# Patient Record
Sex: FEMALE | Race: WHITE | NOT HISPANIC OR LATINO | Employment: OTHER | URBAN - METROPOLITAN AREA
[De-identification: names, ages, dates, MRNs, and addresses within clinical notes are randomized per-mention and may not be internally consistent; named-entity substitution may affect disease eponyms.]

---

## 2017-06-16 ENCOUNTER — OFFICE VISIT (OUTPATIENT)
Dept: URGENT CARE | Facility: URGENT CARE | Age: 52
End: 2017-06-16
Payer: COMMERCIAL

## 2017-06-16 VITALS
HEART RATE: 68 BPM | DIASTOLIC BLOOD PRESSURE: 67 MMHG | TEMPERATURE: 98.8 F | OXYGEN SATURATION: 98 % | SYSTOLIC BLOOD PRESSURE: 111 MMHG | BODY MASS INDEX: 27.77 KG/M2 | WEIGHT: 148.2 LBS

## 2017-06-16 DIAGNOSIS — R30.0 DYSURIA: Primary | ICD-10-CM

## 2017-06-16 DIAGNOSIS — R82.90 NONSPECIFIC FINDING ON EXAMINATION OF URINE: ICD-10-CM

## 2017-06-16 LAB
ALBUMIN UR-MCNC: ABNORMAL MG/DL
APPEARANCE UR: ABNORMAL
BACTERIA #/AREA URNS HPF: ABNORMAL /HPF
BILIRUB UR QL STRIP: ABNORMAL
COLOR UR AUTO: YELLOW
GLUCOSE UR STRIP-MCNC: NEGATIVE MG/DL
HGB UR QL STRIP: ABNORMAL
KETONES UR STRIP-MCNC: NEGATIVE MG/DL
LEUKOCYTE ESTERASE UR QL STRIP: ABNORMAL
NITRATE UR QL: NEGATIVE
NON-SQ EPI CELLS #/AREA URNS LPF: ABNORMAL /LPF
PH UR STRIP: 5.5 PH (ref 5–7)
RBC #/AREA URNS AUTO: ABNORMAL /HPF (ref 0–2)
SP GR UR STRIP: >1.03 (ref 1–1.03)
URN SPEC COLLECT METH UR: ABNORMAL
UROBILINOGEN UR STRIP-ACNC: 0.2 EU/DL (ref 0.2–1)
WBC #/AREA URNS AUTO: ABNORMAL /HPF (ref 0–2)

## 2017-06-16 PROCEDURE — 87186 SC STD MICRODIL/AGAR DIL: CPT | Performed by: FAMILY MEDICINE

## 2017-06-16 PROCEDURE — 99213 OFFICE O/P EST LOW 20 MIN: CPT | Performed by: FAMILY MEDICINE

## 2017-06-16 PROCEDURE — 87088 URINE BACTERIA CULTURE: CPT | Performed by: FAMILY MEDICINE

## 2017-06-16 PROCEDURE — 87086 URINE CULTURE/COLONY COUNT: CPT | Performed by: FAMILY MEDICINE

## 2017-06-16 PROCEDURE — 81001 URINALYSIS AUTO W/SCOPE: CPT | Performed by: FAMILY MEDICINE

## 2017-06-16 RX ORDER — CIPROFLOXACIN 500 MG/1
500 TABLET, FILM COATED ORAL 2 TIMES DAILY
Qty: 14 TABLET | Refills: 0 | Status: SHIPPED | OUTPATIENT
Start: 2017-06-16 | End: 2018-01-25

## 2017-06-16 NOTE — MR AVS SNAPSHOT
After Visit Summary   6/16/2017    Mary Carmen Jenkins    MRN: [id number]           Patient Information     Date Of Birth          1965        Visit Information        Provider Department      6/16/2017 7:30 PM Provider, Na Thompson MD [facility]        Today's Diagnoses     Dysuria    -  1    Nonspecific finding on examination of urine           Follow-ups after your visit        Who to contact     If you have questions or need follow up information about today's clinic visit or your schedule please contact [facility] directly at [phone].  Normal or non-critical lab and imaging results will be communicated to you by Fair[employer]hart, letter or phone within 4 business days after the clinic has received the results. If you do not hear from us within 7 days, please contact the clinic through FairSoftwarehart or phone. If you have a critical or abnormal lab result, we will notify you by phone as soon as possible.  Submit refill requests through [employer] or call your pharmacy and they will forward the refill request to us. Please allow 3 business days for your refill to be completed.          Additional Information About Your Visit        MyChart Information     [employer] gives you secure access to your electronic health record. If you see a primary care provider, you can also send messages to your care team and make appointments. If you have questions, please call your primary care clinic.  If you do not have a primary care provider, please call [phone] and they will assist you.        Care EveryWhere ID     This is your Care EveryWhere ID. This could be used by other organizations to access your [place] medical records  [id number]        Your Vitals Were     Pulse Temperature Pulse Oximetry BMI (Body Mass Index)          68 98.8  F (37.1  C) (Oral) 98% 27.77 kg/m2         Blood Pressure from Last 3 Encounters:   06/16/17 111/67   06/22/16 107/63    02/02/16 102/52    Weight from Last 3 Encounters:   06/16/17 148 lb 3.2 oz (67.2 kg)   02/02/16 142 lb (64.4 kg)   06/14/13 135 lb (61.2 kg)              We Performed the Following     UA with Microscopic reflex to Culture     Urine Culture Aerobic Bacterial          Today's Medication Changes          These changes are accurate as of: 6/16/17  8:23 PM.  If you have any questions, ask your nurse or doctor.               Start taking these medicines.        Dose/Directions    ciprofloxacin 500 MG tablet   Commonly known as:  CIPRO   Used for:  Dysuria   Started by:  Provider, [place] Thompson MD        Dose:  500 mg   Take 1 tablet (500 mg) by mouth 2 times daily   Quantity:  14 tablet   Refills:  0            Where to get your medicines      These medications were sent to [facility] [id number] [place] [address] [address] AT [facility] [address]  [address], [facility] [address]    Hours:  24-hours Phone:  [phone]     ciprofloxacin 500 MG tablet                Primary Care Provider Office Phone # Fax #    Esa Diallo [phone]       [address]  [facility] [address]        Thank you!     Thank you for choosing [facility]  for your care. Our goal is always to provide you with excellent care. Hearing back from our patients is one way we can continue to improve our services. Please take a few minutes to complete the written survey that you may receive in the mail after your visit with us. Thank you!             Your Updated Medication List - Protect others around you: Learn how to safely use, store and throw away your medicines at www.disposemymeds.org.          This list is accurate as of: 6/16/17  8:23 PM.  Always use your most recent med list.                   Brand Name Dispense Instructions for use    ciprofloxacin 500 MG tablet    CIPRO    14 tablet    Take 1 tablet (500 mg) by mouth 2 times daily        Iron 28 MG Tabs      1 tablet daily       MOTRIN IB PO      prn headache       Multi-vitamin Tabs tablet   Generic drug:  multivitamin, therapeutic with minerals      1 TABLET DAILY

## 2017-06-17 LAB
BACTERIA SPEC CULT: ABNORMAL
MICRO REPORT STATUS: ABNORMAL
MICROORGANISM SPEC CULT: ABNORMAL
SPECIMEN SOURCE: ABNORMAL

## 2017-06-17 NOTE — PROGRESS NOTES
SUBJECTIVE:   Mary Carmen Jenkins is a 52 year old female who  presents today for a possible UTI. Symptoms of dysuria and frequency have been going on for 1day(s).  Hematuria no.  sudden onsetand moderate.  There is no history of fever, chills, nausea or vomiting.  No history of vaginal discharge. This patient does not have a history of urinary tract infections. Patient denies rigors, flank pain and temperature > 101 degrees F. or vaginal discharge     Past Medical History:   Diagnosis Date     Excessive or frequent menstruation      Iron deficiency anemia, unspecified      PONV (postoperative nausea and vomiting)      Thyroid disease     benign multinodular goiter     Current Outpatient Prescriptions   Medication Sig Dispense Refill     ciprofloxacin (CIPRO) 500 MG tablet Take 1 tablet (500 mg) by mouth 2 times daily 14 tablet 0     MOTRIN IB OR prn headache       MULTI-VITAMIN OR TABS 1 TABLET DAILY       IRON 28 MG OR TABS 1 tablet daily       Social History   Substance Use Topics     Smoking status: Never Smoker     Smokeless tobacco: Not on file     Alcohol use 0.0 oz/week     0 drink(s) per week      Comment: every 2-3 months        ROS:   Review of systems negative except as stated above.    OBJECTIVE:  /67 (BP Location: Right arm, Patient Position: Chair, Cuff Size: Adult Regular)  Pulse 68  Temp 98.8  F (37.1  C) (Oral)  Wt 148 lb 3.2 oz (67.2 kg)  SpO2 98%  BMI 27.77 kg/m2  GENERAL APPEARANCE: healthy, alert and no distress  RESP: lungs clear to auscultation - no rales, rhonchi or wheezes  CV: regular rates and rhythm, normal S1 S2, no murmur noted  ABDOMEN:  soft, nontender, no HSM or masses and bowel sounds normal  BACK: No CVA tenderness  SKIN: no suspicious lesions or rashes    ASSESSMENT:   Lower, uncomplicated urinary tract infection.  Mary Carmen was seen today for urinary problem.    Diagnoses and all orders for this visit:    Dysuria  -     UA with Microscopic reflex to Culture  -     Urine  Culture Aerobic Bacterial  -     ciprofloxacin (CIPRO) 500 MG tablet; Take 1 tablet (500 mg) by mouth 2 times daily    Nonspecific finding on examination of urine  -     Urine Culture Aerobic Bacterial          PLAN:  As per ordered above  Drink plenty of fluids.  Prevention and treatment of UTI's discussed.Signs and symptoms of pyelonephritis mentioned.  Follow up with primary care physician if not improving

## 2017-06-17 NOTE — NURSING NOTE
"Chief Complaint   Patient presents with     Urinary Problem     Urgency and discomfort with urination x today        Initial /67 (BP Location: Right arm, Patient Position: Chair, Cuff Size: Adult Regular)  Pulse 68  Temp 98.8  F (37.1  C) (Oral)  Wt 148 lb 3.2 oz (67.2 kg)  SpO2 98%  BMI 27.77 kg/m2 Estimated body mass index is 27.77 kg/(m^2) as calculated from the following:    Height as of 2/2/16: 5' 1.25\" (1.556 m).    Weight as of this encounter: 148 lb 3.2 oz (67.2 kg).  Medication Reconciliation: complete    "

## 2018-01-25 ENCOUNTER — OFFICE VISIT (OUTPATIENT)
Dept: INTERNAL MEDICINE | Facility: CLINIC | Age: 53
End: 2018-01-25
Payer: COMMERCIAL

## 2018-01-25 VITALS
TEMPERATURE: 97.9 F | HEART RATE: 78 BPM | HEIGHT: 61 IN | BODY MASS INDEX: 28.3 KG/M2 | WEIGHT: 149.9 LBS | SYSTOLIC BLOOD PRESSURE: 100 MMHG | OXYGEN SATURATION: 96 % | DIASTOLIC BLOOD PRESSURE: 60 MMHG

## 2018-01-25 DIAGNOSIS — Z13.220 LIPID SCREENING: ICD-10-CM

## 2018-01-25 DIAGNOSIS — N64.4 BREAST PAIN: Primary | ICD-10-CM

## 2018-01-25 DIAGNOSIS — D50.9 IRON DEFICIENCY ANEMIA, UNSPECIFIED IRON DEFICIENCY ANEMIA TYPE: ICD-10-CM

## 2018-01-25 DIAGNOSIS — E04.2 MULTINODULAR GOITER: ICD-10-CM

## 2018-01-25 PROCEDURE — 99214 OFFICE O/P EST MOD 30 MIN: CPT | Performed by: PHYSICIAN ASSISTANT

## 2018-01-25 NOTE — MR AVS SNAPSHOT
After Visit Summary   1/25/2018    Mary Carmen Jenkins    MRN: [id number]           Patient Information     Date Of Birth          1965        Visit Information        Provider Department      1/25/2018 11:40 AM Iris Smith PA-C [facility]        Today's Diagnoses     Multinodular goiter    -  1    Breast pain        Iron deficiency anemia, unspecified iron deficiency anemia type        Lipid screening          Care Instructions    Schedule mammogram     Schedule fasting lab appt               Follow-ups after your visit        Future tests that were ordered for you today     Open Future Orders        Priority Expected Expires Ordered    TSH with free T4 reflex Routine  1/25/2019 1/25/2018    MA Diagnostic Digital Bilateral Routine  1/25/2019 1/25/2018    US Breast Bilateral Complete 4 Quadrants Routine  1/25/2019 1/25/2018    Ferritin Routine  1/25/2019 1/25/2018    CBC with platelets Routine  1/25/2019 1/25/2018    Iron and iron binding capacity Routine  1/25/2019 1/25/2018    Lipid panel reflex to direct LDL Fasting Routine 1/25/2018 1/25/2019 1/25/2018            Who to contact     If you have questions or need follow up information about today's clinic visit or your schedule please contact [facility] directly at [phone].  Normal or non-critical lab and imaging results will be communicated to you by MyChart, letter or phone within 4 business days after the clinic has received the results. If you do not hear from us within 7 days, please contact the clinic through [employer] Rob[employer]hart or phone. If you have a critical or abnormal lab result, we will notify you by phone as soon as possible.  Submit refill requests through [employer] or call your pharmacy and they will forward the refill request to us. Please allow 3 business days for your refill to be completed.          Additional Information About Your Visit        MyChart Information     [employer]  "gives you secure access to your electronic health record. If you see a primary care provider, you can also send messages to your care team and make appointments. If you have questions, please call your primary care clinic.  If you do not have a primary care provider, please call [phone] and they will assist you.        Care EveryWhere ID     This is your Care EveryWhere ID. This could be used by other organizations to access your [place] medical records  [id number]        Your Vitals Were     Pulse Temperature Height Pulse Oximetry BMI (Body Mass Index)       78 97.9  F (36.6  C) (Oral) 5' 1.4\" (1.56 m) 96% 27.96 kg/m2        Blood Pressure from Last 3 Encounters:   01/25/18 100/60   06/16/17 111/67   06/22/16 107/63    Weight from Last 3 Encounters:   01/25/18 149 lb 14.4 oz (68 kg)   06/16/17 148 lb 3.2 oz (67.2 kg)   02/02/16 142 lb (64.4 kg)               Primary Care Provider    Esa Diallo MD       No address on file        Equal Access to Services     [facility]: Hadii aad ku hadasho Soomaali, waaxda luqadaha, qaybta kaalmada ademelida, mariza bueno . So [facility] [phone].    ATENCIÓN: Si habla español, tiene a smith disposición servicios gratuitos de asistencia lingüística. [facility] [phone].    We comply with applicable federal civil rights laws and [place] laws. We do not discriminate on the basis of race, color, national origin, age, disability, sex, sexual orientation, or gender identity.            Thank you!     Thank you for choosing [facility]  for your care. Our goal is always to provide you with excellent care. Hearing back from our patients is one way we can continue to improve our services. Please take a few minutes to complete the written survey that you may receive in the mail after your visit with us. Thank you!             Your Updated Medication List - Protect others around you: Learn how to safely use, store and " throw away your medicines at www.disposemymeds.org.          This list is accurate as of 1/25/18 12:19 PM.  Always use your most recent med list.                   Brand Name Dispense Instructions for use Diagnosis    Iron 28 MG Tabs      1 tablet daily        MOTRIN IB PO      prn headache        Multi-vitamin Tabs tablet   Generic drug:  multivitamin, therapeutic with minerals      1 TABLET DAILY

## 2018-01-25 NOTE — PROGRESS NOTES
"  SUBJECTIVE:   Mary Carmen Jenkins is a 52 year old female who presents to clinic today for the following health issues:      R breast lump      Duration: 2 days    Description (location/character/radiation): Lump on the Rt breast    Intensity:  4/10 at its worst, feels tender    Accompanying signs and symptoms: Mild redness around it.    History (similar episodes/previous evaluation): None    Precipitating or alleviating factors: Pressure on it makes it tender    Therapies tried and outcome: Hot pack and ibuprofen.     Pt notes there was some redness, but now that has improved. She notes she did warm compresses which helped. She notes the bump might be smaller now as well. She denies drainage and fever. She has never had this before. She has no history of breast disease or cancer.     Pt would also like her thyroid levels checked. She reports a history of multinodular goiter. She was initially followed by endocrinology. I was able to review these notes in the Media section of her chart which noted they wanted continue surveillance through their department.    Pt would also like her blood checked for iron deficiency anemia. She has a history of this and now that she does not get a period she thinks it should be improved, but would like to review this.   She has no further concerns.      Problem list and histories reviewed & adjusted, as indicated.  Additional history: as documented    Reviewed and updated as needed this visit by clinical staff  Tobacco  Allergies  Meds  Problems  Fam Hx  Soc Hx      Reviewed and updated as needed this visit by Provider  Meds  Problems  Fam Hx           OBJECTIVE:     /60 (BP Location: Left arm, Patient Position: Chair, Cuff Size: Adult Regular)  Pulse 78  Temp 97.9  F (36.6  C) (Oral)  Ht 5' 1.4\" (1.56 m)  Wt 149 lb 14.4 oz (68 kg)  SpO2 96%  BMI 27.96 kg/m2  Body mass index is 27.96 kg/(m^2).  GENERAL: healthy, alert and no distress  NECK: enlarged thyroid noted. "   RESP: lungs clear to auscultation - no rales, rhonchi or wheezes  BREAST:   Tenderness over the right breast at 11:00 without a lump noted, but pt is quite certain she had felt changes and can feel a small lump now, but I can not appreciate this; otherwise, normal without masses,or nipple discharge and no palpable axillary masses or adenopathy  CV: regular rates and rhythm, normal S1 S2, no S3 or S4 and no murmur, click or rub    ASSESSMENT/PLAN:       1. Breast pain  - pt had noted lump and pain over the past few days   - MA Diagnostic Digital Bilateral; Future  - US Breast Bilateral Complete 4 Quadrants; Future    2. Multinodular goiter  - reviewed recommendation to follow up with endocrinology for continued surveillance and pt declined- I reviewed the benefits of this and risk of not continuing to do this and pt continued declined  - recommended US of thyroid and pt declined   - TSH with free T4 reflex; Future    3. Iron deficiency anemia, unspecified iron deficiency anemia type  - Ferritin; Future  - CBC with platelets; Future  - Iron and iron binding capacity; Future    4. Lipid screening  - Lipid panel reflex to direct LDL Fasting; Future    Pt agrees to the above plan and all questions were answered.     Iris Smith PA-C  [facility]

## 2018-01-26 ENCOUNTER — HOSPITAL ENCOUNTER (OUTPATIENT)
Dept: MAMMOGRAPHY | Facility: CLINIC | Age: 53
Discharge: HOME | End: 2018-01-26
Attending: PHYSICIAN ASSISTANT | Admitting: PHYSICIAN ASSISTANT
Payer: COMMERCIAL

## 2018-01-26 ENCOUNTER — HOSPITAL ENCOUNTER (OUTPATIENT)
Dept: MAMMOGRAPHY | Facility: CLINIC | Age: 53
End: 2018-01-26
Attending: PHYSICIAN ASSISTANT
Payer: COMMERCIAL

## 2018-01-26 DIAGNOSIS — N64.4 BREAST PAIN: ICD-10-CM

## 2018-01-26 DIAGNOSIS — E04.2 MULTINODULAR GOITER: ICD-10-CM

## 2018-01-26 DIAGNOSIS — D50.9 IRON DEFICIENCY ANEMIA, UNSPECIFIED IRON DEFICIENCY ANEMIA TYPE: ICD-10-CM

## 2018-01-26 DIAGNOSIS — Z13.220 LIPID SCREENING: ICD-10-CM

## 2018-01-26 LAB
CHOLEST SERPL-MCNC: 139 MG/DL
ERYTHROCYTE [DISTWIDTH] IN BLOOD BY AUTOMATED COUNT: 13 % (ref 10–15)
FERRITIN SERPL-MCNC: 30 NG/ML (ref 8–252)
HCT VFR BLD AUTO: 43.9 % (ref 35–47)
HDLC SERPL-MCNC: 76 MG/DL
HGB BLD-MCNC: 14.4 G/DL (ref 11.7–15.7)
IRON SATN MFR SERPL: 20 % (ref 15–46)
IRON SERPL-MCNC: 52 UG/DL (ref 35–180)
LDLC SERPL CALC-MCNC: 55 MG/DL
MCH RBC QN AUTO: 29 PG (ref 26.5–33)
MCHC RBC AUTO-ENTMCNC: 32.8 G/DL (ref 31.5–36.5)
MCV RBC AUTO: 88 FL (ref 78–100)
NONHDLC SERPL-MCNC: 63 MG/DL
PLATELET # BLD AUTO: 259 10E9/L (ref 150–450)
RBC # BLD AUTO: 4.97 10E12/L (ref 3.8–5.2)
TIBC SERPL-MCNC: 258 UG/DL (ref 240–430)
TRIGL SERPL-MCNC: 42 MG/DL
TSH SERPL DL<=0.005 MIU/L-ACNC: 1.2 MU/L (ref 0.4–4)
WBC # BLD AUTO: 5.5 10E9/L (ref 4–11)

## 2018-01-26 PROCEDURE — 76642 ULTRASOUND BREAST LIMITED: CPT | Mod: RT

## 2018-01-26 PROCEDURE — 82728 ASSAY OF FERRITIN: CPT | Performed by: PHYSICIAN ASSISTANT

## 2018-01-26 PROCEDURE — 85027 COMPLETE CBC AUTOMATED: CPT | Performed by: PHYSICIAN ASSISTANT

## 2018-01-26 PROCEDURE — 83540 ASSAY OF IRON: CPT | Performed by: PHYSICIAN ASSISTANT

## 2018-01-26 PROCEDURE — G0279 TOMOSYNTHESIS, MAMMO: HCPCS

## 2018-01-26 PROCEDURE — 80061 LIPID PANEL: CPT | Performed by: PHYSICIAN ASSISTANT

## 2018-01-26 PROCEDURE — 83550 IRON BINDING TEST: CPT | Performed by: PHYSICIAN ASSISTANT

## 2018-01-26 PROCEDURE — 36415 COLL VENOUS BLD VENIPUNCTURE: CPT | Performed by: PHYSICIAN ASSISTANT

## 2018-01-26 PROCEDURE — 84443 ASSAY THYROID STIM HORMONE: CPT | Performed by: PHYSICIAN ASSISTANT

## 2018-12-06 ENCOUNTER — OFFICE VISIT (OUTPATIENT)
Dept: FAMILY MEDICINE | Facility: CLINIC | Age: 53
End: 2018-12-06
Payer: COMMERCIAL

## 2018-12-06 VITALS
DIASTOLIC BLOOD PRESSURE: 70 MMHG | OXYGEN SATURATION: 98 % | HEART RATE: 86 BPM | RESPIRATION RATE: 14 BRPM | SYSTOLIC BLOOD PRESSURE: 108 MMHG | BODY MASS INDEX: 27 KG/M2 | WEIGHT: 143 LBS | TEMPERATURE: 98.4 F | HEIGHT: 61 IN

## 2018-12-06 DIAGNOSIS — E04.2 MULTINODULAR GOITER: ICD-10-CM

## 2018-12-06 DIAGNOSIS — E55.9 VITAMIN D DEFICIENCY: ICD-10-CM

## 2018-12-06 DIAGNOSIS — M25.50 PAIN IN JOINT, MULTIPLE SITES: Primary | ICD-10-CM

## 2018-12-06 PROCEDURE — 82306 VITAMIN D 25 HYDROXY: CPT | Performed by: FAMILY MEDICINE

## 2018-12-06 PROCEDURE — 99214 OFFICE O/P EST MOD 30 MIN: CPT | Performed by: FAMILY MEDICINE

## 2018-12-06 PROCEDURE — 80053 COMPREHEN METABOLIC PANEL: CPT | Performed by: FAMILY MEDICINE

## 2018-12-06 PROCEDURE — 36415 COLL VENOUS BLD VENIPUNCTURE: CPT | Performed by: FAMILY MEDICINE

## 2018-12-06 NOTE — MR AVS SNAPSHOT
After Visit Summary   12/6/2018    Mary Carmen Jenkins    MRN: [id number]           Patient Information     Date Of Birth          1965        Visit Information        Provider Department      12/6/2018 3:30 PM Chandrika Grimes DO [facility]        Today's Diagnoses     Pain in joint, multiple sites    -  1       Follow-ups after your visit        Additional Services     RHEUMATOLOGY REFERRAL       Your provider has referred you to: FMG:  [facility].  [phone] [email or web address]    FMG:  [facility]   [phone] [email or web address]  [email or web address] [phone]   [email or web address]    Please be aware that coverage of these services is subject to the terms and limitations of your health insurance plan.  Call member services at your health plan with any benefit or coverage questions.      Please bring the following with you to your appointment:    (1) Any X-Rays, CTs or MRIs which have been performed.  Contact the facility where they were done to arrange for  prior to your scheduled appointment.    (2) List of current medications   (3) This referral request   (4) Any documents/labs given to you for this referral                  Who to contact     If you have questions or need follow up information about today's clinic visit or your schedule please contact [facility] directly at [phone].  Normal or non-critical lab and imaging results will be communicated to you by MyChart, letter or phone within 4 business days after the clinic has received the results. If you do not hear from us within 7 days, please contact the clinic through MyChart or phone. If you have a critical or abnormal lab result, we will notify you by phone as soon as possible.  Submit refill requests through MyChart or call your  "pharmacy and they will forward the refill request to us. Please allow 3 business days for your refill to be completed.          Additional Information About Your Visit        [employer] Information     [employer] gives you secure access to your electronic health record. If you see a primary care provider, you can also send messages to your care team and make appointments. If you have questions, please call your primary care clinic.  If you do not have a primary care provider, please call [phone] and they will assist you.        Care EveryWhere ID     This is your Care EveryWhere ID. This could be used by other organizations to access your [place] medical records  [id number]        Your Vitals Were     Pulse Temperature Respirations Height Pulse Oximetry Breastfeeding?    86 98.4  F (36.9  C) (Tympanic) 14 5' 1.4\" (1.56 m) 98% No    BMI (Body Mass Index)                   26.67 kg/m2            Blood Pressure from Last 3 Encounters:   12/06/18 108/70   01/25/18 100/60   06/16/17 111/67    Weight from Last 3 Encounters:   12/06/18 143 lb (64.9 kg)   01/25/18 149 lb 14.4 oz (68 kg)   06/16/17 148 lb 3.2 oz (67.2 kg)              We Performed the Following     RHEUMATOLOGY REFERRAL          Today's Medication Changes          These changes are accurate as of 12/6/18  4:12 PM.  If you have any questions, ask your nurse or doctor.               Stop taking these medicines if you haven't already. Please contact your care team if you have questions.     Iron 28 MG Tabs   Stopped by:  Chandrika Grimes DO                    Primary Care Provider    Esa Diallo MD       No address on file        Equal Access to Services     [facility]: Hadii aad ku hadasho Soomaali, waaxda luqadaha, qaybta kaalmada adeegyada, mariza bueno . So [facility] [phone].    ATENCIÓN: Si habla español, tiene a smith disposición servicios gratuitos de asistencia lingüística. Llame al [phone].    We " comply with applicable federal civil rights laws and [place] laws. We do not discriminate on the basis of race, color, national origin, age, disability, sex, sexual orientation, or gender identity.            Thank you!     Thank you for choosing [facility]  for your care. Our goal is always to provide you with excellent care. Hearing back from our patients is one way we can continue to improve our services. Please take a few minutes to complete the written survey that you may receive in the mail after your visit with us. Thank you!             Your Updated Medication List - Protect others around you: Learn how to safely use, store and throw away your medicines at www.disposemymeds.org.          This list is accurate as of 12/6/18  4:12 PM.  Always use your most recent med list.                   Brand Name Dispense Instructions for use Diagnosis    MOTRIN IB PO      prn headache        Multi-vitamin tablet   Generic drug:  multivitamin w/minerals      1 TABLET DAILY

## 2018-12-06 NOTE — PROGRESS NOTES
"  SUBJECTIVE:   Mary Carmen Jenkins is a 53 year old female who presents to clinic today for the following health issues:        Consult regarding widespread joint pain      Duration: Since last Spring 2018    Description (location/character/radiation): Knees, hips, groin, inner thigh, shoulders    Intensity:  7/10    Accompanying signs and symptoms: None    History (similar episodes/previous evaluation): Seen at [facility] and was at PT.    Precipitating or alleviating factors: Standing up causes knee pain and stiffness    Therapies tried and outcome: PT, NSAID's           Problem list and histories reviewed & adjusted, as indicated.  Additional history: as documented    Labs reviewed in EPIC    Reviewed and updated as needed this visit by clinical staff  Tobacco  Allergies  Meds  Problems  Med Hx  Surg Hx  Fam Hx  Soc Hx        Reviewed and updated as needed this visit by Provider  Allergies  Meds  Problems         ROS:  CONSTITUTIONAL: NEGATIVE for fever, chills, change in weight  INTEGUMENTARY/SKIN: NEGATIVE for worrisome rashes, moles or lesions  EYES: NEGATIVE for vision changes or irritation  ENT/MOUTH: NEGATIVE for ear, mouth and throat problems  RESP: NEGATIVE for significant cough or SOB  CV: NEGATIVE for chest pain, palpitations or peripheral edema  GI: NEGATIVE for nausea, abdominal pain, heartburn, or change in bowel habits  : NEGATIVE for frequency, dysuria, or hematuria  NEURO: NEGATIVE for weakness, dizziness or paresthesias  HEME: NEGATIVE for bleeding problems    OBJECTIVE:     /70 (Cuff Size: Adult Regular)  Pulse 86  Temp 98.4  F (36.9  C) (Tympanic)  Resp 14  Ht 5' 1.4\" (1.56 m)  Wt 143 lb (64.9 kg)  SpO2 98%  Breastfeeding? No  BMI 26.67 kg/m2  Body mass index is 26.67 kg/(m^2).   GENERAL: healthy, alert and no distress  EYES: Eyes grossly normal to inspection, PERRL and conjunctivae and sclerae normal  HENT: ear canals and TM's normal, nose and mouth without ulcers or " "lesions  NECK: no adenopathy, no asymmetry, masses, or scars and thyroid normal to palpation  RESP: lungs clear to auscultation - no rales, rhonchi or wheezes  CV: regular rate and rhythm, normal S1 S2, no S3 or S4, no murmur, click or rub, no peripheral edema and peripheral pulses strong  ABDOMEN: soft, nontender, no hepatosplenomegaly, no masses and bowel sounds normal  MS: left shoulder ROM and strength is restricted due to her \"frozen shoulder.\"  +tenderness reproducible with moderate palpation behind her knee joints.   SKIN: no suspicious lesions or rashes  NEURO: sensory exam grossly normal and mentation intact  PSYCH: mentation appears normal, affect normal/bright    Diagnostic Test Results:  CMP, Vitamin D  ASSESSMENT/PLAN:     Problem List Items Addressed This Visit     Multinodular goiter      Other Visit Diagnoses     Pain in joint, multiple sites    -  Primary    Relevant Orders    RHEUMATOLOGY REFERRAL    Comprehensive metabolic panel (BMP + Alb, Alk Phos, ALT, AST, Total. Bili, TP) (Completed)    Vitamin D deficiency        Relevant Orders    Vitamin D Deficiency (Completed)           New pt to myself and to this clinic.  Previous care at [facility].  Has a long history of multiple joint pains since last Spring 2018.  Currently sees [facility] and gets Physical Therapy.  Mary Carmen is here to establish care and is requesting the \"next steps\" in her joint pain work-up.    Labs so far are NORMAL:  Lipids (1/2018), iron studies (1/2018), TSH (1/2018), CBC (9/2018)  MOON (9/2018), RF (9/2018), CCP (9/2018), CRP (9/2018)  Lyme disease (Negative, 9/2018)    Imaging:    XR Pelvis and b/l Hips (9/2018):  AP pelvis and lateral views of both hips. Bony structures appear intact. Hip joint spaces appear within normal limits. There are mild degenerative changes of the pubic symphysis. There is no dislocation or significant degenerative change in the hips or SI joints.  Left knee XR (6/2016):  3 views of the left knee show " no acute fracture,  malalignment, or knee joint effusion. Incidentally seen is a small 0.4  cm a calcific/ossific structure that projects at the anteromedial  margin of the knee joint. This may represent a dystrophic soft tissue  calcification. A small joint osteochondral body would be considered  less likely.      I am recommending that she continue seeing PT and make f/u appt with [facility] since they may consider further imaging of her spine and joints.  Referring her to Rheumatology as requested for an evaluation to see if there is any Rheumatologic explanation for her joint pains, though has negative MOON, RF, CCP, CRP and Lyme disease.  Joint pains seems to improve with NSAID use.    Requesting Vitamin D level be checked as well.  Will get CMP as well since it has not been checked for some time.  Consider PMR referral if needed.  Encouraged her to f/u with her Endocrinologist (Dr. Menchaca) for her multiple nodular goiter.  Last Thyroid U/S in 2013 was stable.     Chandrika Grimes, DO  [facility]

## 2018-12-07 LAB
ALBUMIN SERPL-MCNC: 3.9 G/DL (ref 3.4–5)
ALP SERPL-CCNC: 101 U/L (ref 40–150)
ALT SERPL W P-5'-P-CCNC: 22 U/L (ref 0–50)
ANION GAP SERPL CALCULATED.3IONS-SCNC: 7 MMOL/L (ref 3–14)
AST SERPL W P-5'-P-CCNC: 19 U/L (ref 0–45)
BILIRUB SERPL-MCNC: 0.5 MG/DL (ref 0.2–1.3)
BUN SERPL-MCNC: 15 MG/DL (ref 7–30)
CALCIUM SERPL-MCNC: 9.6 MG/DL (ref 8.5–10.1)
CHLORIDE SERPL-SCNC: 103 MMOL/L (ref 94–109)
CO2 SERPL-SCNC: 28 MMOL/L (ref 20–32)
CREAT SERPL-MCNC: 0.81 MG/DL (ref 0.52–1.04)
GFR SERPL CREATININE-BSD FRML MDRD: 73 ML/MIN/1.7M2
GLUCOSE SERPL-MCNC: 93 MG/DL (ref 70–99)
POTASSIUM SERPL-SCNC: 3.9 MMOL/L (ref 3.4–5.3)
PROT SERPL-MCNC: 7.8 G/DL (ref 6.8–8.8)
SODIUM SERPL-SCNC: 138 MMOL/L (ref 133–144)

## 2018-12-08 LAB — DEPRECATED CALCIDIOL+CALCIFEROL SERPL-MC: 37 UG/L (ref 20–75)

## 2018-12-13 ENCOUNTER — TRANSFERRED RECORDS (OUTPATIENT)
Dept: HEALTH INFORMATION MANAGEMENT | Facility: CLINIC | Age: 53
End: 2018-12-13

## 2019-02-15 NOTE — PROGRESS NOTES
[facility] - Rheumatology Clinic Visit     Mary Carmen Jenkins MRN# [id number]   YOB: 1965    Primary care provider: Esa Diallo  Feb 18, 2019          Assessment and Plan:   # Right trochanteric bursitis  # Left frozen shoulder  # Upper back pain- since mid 2018  # Bilateral knee pain - posterior    Basic blood cell counts, liver and kidney function labs within normal limits  MOON neg.   RF neg.   ACPA neg.   CRP within normal limits; sed rate within normal limits   Lyme neg.     We reviewed above lab results. No concerns for rheumatological problems in the labs.     We discussed that there is no evidence of or suspicion for systemic inflammatory autoimmune rheumatic condition in her - both clinically and laboratory-kirk. Patient felt reassured. Her knee pain are in the popliteal area and not articular in origin. No joint effusion. Left frozen shoulder followed by ortho. Her right hip pain is likely trochanteric bursitis. I recommend that she discusses local cortisone injection for this with her orthopedic doctor. Upper back pain is mechanical in nature. No suspicion for spondyloarthropathy. I recommended trying tylenol PRN OTC.   We will fax this note to [facility].     The labs from patient records are reviewed.     I will be back in touch with the patient through mychart/letter when results are available.     Patient agrees with the above mentioned treatment plan.     Most Recent Immunizations   Administered Date(s) Administered     TDAP Vaccine (Adacel) 02/23/2010, 02/23/2010       Orders Placed This Encounter   Procedures     Uric acid       Data Unavailable    There are no discontinued medications.  Current Outpatient Medications   Medication Sig Dispense Refill     MOTRIN IB OR prn headache       MULTI-VITAMIN OR TABS 1 TABLET DAILY         Rohit Alvarado MD  [facility]          Active Problem List:     Patient Active Problem List    Diagnosis Date Noted     Atypical  nevi--Dermatology followup every 6 months 06/18/2012     Priority: Medium     Multinodular goiter 06/18/2012     Priority: Medium     CARDIOVASCULAR SCREENING; LDL GOAL LESS THAN 160 10/31/2010     Priority: Medium     Iron deficiency anemia 02/23/2010     Priority: Medium     Menorrhagia 02/23/2010     Priority: Medium     Alopecia 03/31/2008     Priority: Medium     Problem list name updated by automated process. Provider to review              History of Present Illness:     Chief Complaint   Patient presents with     Establish Care     Referral     Chandrika Grimes DO       February 15, 2019     Have you ever seen a rheumatologist No Who No When NA  Joint pain history  Onset: Patient is here for stiffness in inner thigh, shoulders, upper spine, thighs, and legs that started this spring. Went to [facility] and got basic blood work done and also got a frozen left shoulder. Does note started with plantar fasciitis in December 2017 and the stiffness was more in Spring 2018 after sitting in chair.   Involved joints: see above  Pain scale:  3.5/10  ; worse in the morning  Wakes the patient from sleep : No  Morning stiffness:Yes for 5 minutes; 10-15 mins for upper back   Meds used:motrin IB (not used in a month), but has not used in over a month and more for headaches     Interim history  Since last visit:  1. Infections - No  2. New symptoms/medical problem - No  3. Any side effects from Rheum medications -NA  3. ER visits/Hospitalizations/surgeries - No  4. Last PCP visit: 12/6/18      Wt Readings from Last 4 Encounters:   02/18/19 65.3 kg (144 lb)   12/06/18 64.9 kg (143 lb)   01/25/18 68 kg (149 lb 14.4 oz)   06/16/17 67.2 kg (148 lb 3.2 oz)       No h/o unintentional weight loss, fevers, rash, swollen glands  No h/o gout  No family or personal history of psoriasis, ulcerative colitis or chron's disease. No h/o iritis  Patient denies any raynauds  No h/o persistent shortness of breath, cough, chest pain  No h/o  persistent vomiting, diarrhea, abdominal pain  No h/o hematochezia, hematuria, hemoptysis  No h/o seizures   No h/o cancer    BP Readings from Last 3 Encounters:   19 106/66   18 108/70   18 100/60              Review of Systems:   Complete ROS negative except for symptoms mentioned in the HPI          Past Medical History:     Past Medical History:   Diagnosis Date     Excessive or frequent menstruation      Iron deficiency anemia, unspecified      PONV (postoperative nausea and vomiting)      Thyroid disease     benign multinodular goiter     Past Surgical History:   Procedure Laterality Date     BIOPSY OF SKIN LESION      left scapula (premalignant)     C  DELIVERY ONLY       COLONOSCOPY  2013    Procedure: COLONOSCOPY;  Colonoscopy;  Surgeon: Waldeamr Cuellar MD;  Location:  GI     MOHS MICROGRAPHIC PROCEDURE      right leg     THYROID FNA      Benign.  Normal thyroid function tests            Social History:     Social History     Occupational History     Occupation: Dietician     Employer: [employer]   Tobacco Use     Smoking status: Never Smoker     Smokeless tobacco: Never Used   Substance and Sexual Activity     Alcohol use: Yes     Alcohol/week: 0.0 oz     Comment: every 2-3 months      Drug use: No     Sexual activity: Yes     Partners: Male     Birth control/protection: Surgical     Comment: Vasectomy            Family History:     Family History   Problem Relation Age of Onset     Cerebrovascular Disease Mother 87        massive hemorrhagic stroke, .     Alcohol/Drug Father         recovering times 10 years        Cancer Father         lung     Asthma Other         nephew     C.A.D. Brother         by-pass, 60s     C.A.D. Maternal Uncle         by-pass     Diabetes Maternal Aunt         IDDM     Diabetes Maternal Uncle         IDDM     Hypertension Maternal Aunt      Hypertension Maternal Uncle      Cerebrovascular Disease Maternal Grandmother      Cancer  "Brother      Cancer Paternal Aunt         unsure     Cancer Paternal Uncle         unsure     Heart Disease Maternal Uncle         MI     Lipids Brother      Lipids Brother      Obesity Maternal Aunt      Obesity Maternal Uncle      Obesity Paternal Aunt      Obesity Brother      Obesity Brother             Allergies:     Allergies   Allergen Reactions     Seasonal Allergies      Eyes itchy, runny nose            Medications:     Current Outpatient Medications   Medication Sig Dispense Refill     MOTRIN IB OR prn headache       MULTI-VITAMIN OR TABS 1 TABLET DAILY              Physical Exam:   Blood pressure 106/66, pulse 73, temperature 98  F (36.7  C), temperature source Oral, height 1.56 m (5' 1.4\"), weight 65.3 kg (144 lb), SpO2 99 %, not currently breastfeeding.  Wt Readings from Last 4 Encounters:   02/18/19 65.3 kg (144 lb)   12/06/18 64.9 kg (143 lb)   01/25/18 68 kg (149 lb 14.4 oz)   06/16/17 67.2 kg (148 lb 3.2 oz)       Constitutional: well-developed, appearing stated age; cooperative  Eyes: normal conjunctiva, sclera  ENT: nl external ears, nose, lips.No mucous membrane lesions, normal saliva pool  Neck: no cervical lymphadenopathy  Resp: lungs clear to auscultation in the bases,   CV: RRR, no added sounds  GI: Abdomen soft and no tenderness  : not tested  Lymph: no cervical, supraclavicular or epitrochlear nodes  MS: Mild right trochanteric bursa tenderness. All elbow, wrist, MCP/PIP/DIP, hip, knee, ankle, and foot MTP/IP joints were examined and  found without active synovitis or major deformity. Full ROM.  No dactylitis,  tenosynovitis, enthesopathy.Left shoulder abduction painful beyond about 120 degrees.   Skin: no rash in exposed areas  Psych: nl judgement, orientation, memory, affect.         Data:         Rohit Alvarado MD    [facility]    "

## 2019-02-18 ENCOUNTER — OFFICE VISIT (OUTPATIENT)
Dept: RHEUMATOLOGY | Facility: CLINIC | Age: 54
End: 2019-02-18
Payer: COMMERCIAL

## 2019-02-18 VITALS
BODY MASS INDEX: 27.19 KG/M2 | OXYGEN SATURATION: 99 % | DIASTOLIC BLOOD PRESSURE: 66 MMHG | HEART RATE: 73 BPM | WEIGHT: 144 LBS | TEMPERATURE: 98 F | HEIGHT: 61 IN | SYSTOLIC BLOOD PRESSURE: 106 MMHG

## 2019-02-18 DIAGNOSIS — M25.50 POLYARTHRALGIA: ICD-10-CM

## 2019-02-18 DIAGNOSIS — M54.9 UPPER BACK PAIN: Primary | ICD-10-CM

## 2019-02-18 PROCEDURE — 36415 COLL VENOUS BLD VENIPUNCTURE: CPT | Performed by: INTERNAL MEDICINE

## 2019-02-18 PROCEDURE — 99204 OFFICE O/P NEW MOD 45 MIN: CPT | Performed by: INTERNAL MEDICINE

## 2019-02-18 PROCEDURE — 84550 ASSAY OF BLOOD/URIC ACID: CPT | Performed by: INTERNAL MEDICINE

## 2019-02-18 ASSESSMENT — ROUTINE ASSESSMENT OF PATIENT INDEX DATA (RAPID3)
RAPID3 INTERPRETATION: MODERATE 6.1-12.0
TOTAL RAPID3 SCORE: 9.5

## 2019-02-18 ASSESSMENT — MIFFLIN-ST. JEOR: SCORE: 1191.91

## 2019-02-18 NOTE — NURSING NOTE
"Chief Complaint   Patient presents with     Establish Care     Referral     Chandrika Grimes DO       Initial /66   Pulse 73   Temp 98  F (36.7  C) (Oral)   Ht 1.56 m (5' 1.4\")   Wt 65.3 kg (144 lb)   SpO2 99%   BMI 26.86 kg/m   Estimated body mass index is 26.86 kg/m  as calculated from the following:    Height as of this encounter: 1.56 m (5' 1.4\").    Weight as of this encounter: 65.3 kg (144 lb).  Medication Reconciliation: complete    Have you ever seen a rheumatologist No Who No When NA  Joint pain history  Onset: Patient is here for stiffness in inner thigh, shoulders, upper spine, thighs, and legs that started this spring. Went to [facility] and got basic blood work done and also got a frozen left shoulder. Does note started with plantar fasciitis in December 2017 and the stiffness was more in Spring 2018 after sitting in chair.   Involved joints: see above  Pain scale:  3.5/10     Wakes the patient from sleep : No  Morning stiffness:Yes for 5 minutes  Meds used:motrin IB, but has not used in over a month and more for headaches    Interim history  Since last visit:  1. Infections - No  2. New symptoms/medical problem - No  3. Any side effects from Rheum medications -NA  3. ER visits/Hospitalizations/surgeries - No  4. Last PCP visit: 12/6/18  Wt Readings from Last 4 Encounters:   02/18/19 65.3 kg (144 lb)   12/06/18 64.9 kg (143 lb)   01/25/18 68 kg (149 lb 14.4 oz)   06/16/17 67.2 kg (148 lb 3.2 oz)     BP Readings from Last 3 Encounters:   02/18/19 106/66   12/06/18 108/70   01/25/18 100/60       "

## 2019-02-18 NOTE — PATIENT INSTRUCTIONS
# Please discuss with your [facility] doctor regarding trochanteric bursitis and local cortisone injection  # Try tylenol (over-the-counter) dosing

## 2019-02-18 NOTE — NURSING NOTE
Today's OV faxed to PCP Shruthi Dougherty at [facility]  per patient request at [phone].    Kate Douglas, CMA

## 2019-02-19 LAB — URATE SERPL-MCNC: 3.4 MG/DL (ref 2.6–6)

## 2019-02-19 NOTE — RESULT ENCOUNTER NOTE
Results released to [facility]:  Uric acid gout test within normal limits       Sincerely    Rohit Alvarado MD  [facility]

## 2021-04-19 ENCOUNTER — IMMUNIZATION (OUTPATIENT)
Dept: NURSING | Facility: CLINIC | Age: 56
End: 2021-04-19
Payer: COMMERCIAL

## 2021-04-19 PROCEDURE — 91300 PR COVID VAC PFIZER DIL RECON 30 MCG/0.3 ML IM: CPT

## 2021-04-19 PROCEDURE — 0001A PR COVID VAC PFIZER DIL RECON 30 MCG/0.3 ML IM: CPT

## 2021-05-13 ENCOUNTER — IMMUNIZATION (OUTPATIENT)
Dept: NURSING | Facility: CLINIC | Age: 56
End: 2021-05-13
Attending: INTERNAL MEDICINE
Payer: COMMERCIAL

## 2021-05-13 PROCEDURE — 0002A PR COVID VAC PFIZER DIL RECON 30 MCG/0.3 ML IM: CPT

## 2021-05-13 PROCEDURE — 91300 PR COVID VAC PFIZER DIL RECON 30 MCG/0.3 ML IM: CPT

## 2022-02-12 ENCOUNTER — LAB (OUTPATIENT)
Dept: URGENT CARE | Facility: URGENT CARE | Age: 57
End: 2022-02-12
Attending: FAMILY MEDICINE
Payer: COMMERCIAL

## 2022-02-12 ENCOUNTER — E-VISIT (OUTPATIENT)
Dept: URGENT CARE | Facility: CLINIC | Age: 57
End: 2022-02-12
Payer: COMMERCIAL

## 2022-02-12 DIAGNOSIS — J02.9 SORE THROAT: ICD-10-CM

## 2022-02-12 DIAGNOSIS — Z20.822 SUSPECTED COVID-19 VIRUS INFECTION: ICD-10-CM

## 2022-02-12 LAB
DEPRECATED S PYO AG THROAT QL EIA: NEGATIVE
GROUP A STREP BY PCR: NOT DETECTED
SARS-COV-2 RNA RESP QL NAA+PROBE: NEGATIVE

## 2022-02-12 PROCEDURE — 87651 STREP A DNA AMP PROBE: CPT

## 2022-02-12 PROCEDURE — U0003 INFECTIOUS AGENT DETECTION BY NUCLEIC ACID (DNA OR RNA); SEVERE ACUTE RESPIRATORY SYNDROME CORONAVIRUS 2 (SARS-COV-2) (CORONAVIRUS DISEASE [COVID-19]), AMPLIFIED PROBE TECHNIQUE, MAKING USE OF HIGH THROUGHPUT TECHNOLOGIES AS DESCRIBED BY CMS-2020-01-R: HCPCS

## 2022-02-12 PROCEDURE — 99421 OL DIG E/M SVC 5-10 MIN: CPT | Performed by: FAMILY MEDICINE

## 2022-02-12 PROCEDURE — U0005 INFEC AGEN DETEC AMPLI PROBE: HCPCS

## 2022-02-12 NOTE — PATIENT INSTRUCTIONS
Mary Carmen,    Complicated there with your recent history of covid and how bad the throat is in the mix. I agree it makes sense to test you for both strep and covid.     What should I do?  We would like to test you for COVID-19 virus and Strep Throat. I have placed orders for these tests. To schedule: go to your [facility]-care home page and scroll down to the section that says  You have an appointment that needs to be scheduled  and click the large green button that says  Schedule Now  and follow the steps to find the next available openings. It is important that when you are asked what the reason for your appointment is that you mention you need BOTH COVID and Strep tests.    If you are unable to complete these H-care scheduling steps, please call [phone] to schedule your testing.     Return to work/school/ guidance:   Please let your workplace manager and staffing office know when your isolation ends.       If you receive a positive COVID-19 test result, follow the guidance of the those who are giving you the results. Usually the return to work is 10 days from symptom onset or positive test date (or in some cases 20 days if you are immunocompromised). If your symptoms started after your positive test, the 10 days should start when your symptoms started.   o If you work at [employer], you must also be cleared by Employee Occupational Health and Safety to return to work.      If you receive a negative COVID-19 test result and did not have a high risk exposure to someone with a known positive COVID-19 test, you can return to work once you're free of fever for 24 hours without fever-reducing medication and your symptoms are improving or resolved.    If you receive a negative COVID-19 test and if you had a high risk exposure to someone who has tested positive for COVID-19 then you can return to work 14 days after your last contact with the positive individual. Follow quarantine guidance given by your doctor or  public health officials.    Sign up for [employer].   We know it's scary to hear that you might have COVID-19. We want to track your symptoms to make sure you're okay over the next 2 weeks. Please look for an email from [employer]--this is a free, online program that we'll use to keep in touch. To sign up, follow the link in the email you will receive. Learn more at [email or web address]    How can I take care of myself?  Over the counter medications may help with your symptoms like congestion, cough, chills, or fever.       Get lots of rest. Drink extra fluids (unless a doctor has told you not to)    Take Tylenol (acetaminophen) or ibuprofen for fever or pain. If you have liver or kidney problems, ask your family doctor if it's okay to take Tylenol or ibuprofen    Take over the counter medications for your symptoms, as directed by your doctor. You may also talk to your pharmacist.      If you have other health problems (like cancer, heart failure, an organ transplant or severe kidney disease): Call your specialty clinic if you don't feel better in the next 2 days.    Know when to call 911. Emergency warning signs include:  o Trouble breathing or shortness of breath  o Pain or pressure in the chest that doesn't go away  o Feeling confused like you haven't felt before, or not being able to wake up  o Bluish-colored lips or face    Where can I get more information?    [facility] - About COVID-19: [email or web address]     CDC - What to Do If You're Sick:   www.cdc.gov/coronavirus/2019-ncov/about/steps-when-sick.html    CDC - Ending Home Isolation:  https://www.cdc.gov/coronavirus/2019-ncov/your-health/quarantine-isolation.html    CDC - Caring for Someone:  www.cdc.gov/coronavirus/2019-ncov/if-you-are-sick/care-for-someone.html    [facility] clinical trials (COVID-19 research studies): [email or web address]    Below are the COVID-19 hotlines at the  [place] Department of Health ([facility]). Interpreters are available.  o For health questions: Call 171-643-1199 or 1-340.956.2106 (7 a.m. to 7 p.m.)  o For questions about schools and childcare: Call 524-495-1279 or 1-851.314.9235 (7 a.m. to 7 p.m.)

## 2022-10-01 ENCOUNTER — HOSPITAL ENCOUNTER (EMERGENCY)
Facility: CLINIC | Age: 57
Discharge: HOME | End: 2022-10-01
Attending: EMERGENCY MEDICINE | Admitting: EMERGENCY MEDICINE
Payer: COMMERCIAL

## 2022-10-01 VITALS
TEMPERATURE: 97.6 F | RESPIRATION RATE: 18 BRPM | OXYGEN SATURATION: 98 % | DIASTOLIC BLOOD PRESSURE: 75 MMHG | SYSTOLIC BLOOD PRESSURE: 134 MMHG | HEART RATE: 96 BPM

## 2022-10-01 DIAGNOSIS — N39.0 ACUTE UTI: ICD-10-CM

## 2022-10-01 DIAGNOSIS — H04.552 OBSTRUCTION OF LEFT LACRIMAL DUCT: ICD-10-CM

## 2022-10-01 LAB
ALBUMIN UR-MCNC: 30 MG/DL
APPEARANCE UR: ABNORMAL
BACTERIA #/AREA URNS HPF: ABNORMAL /HPF
BILIRUB UR QL STRIP: NEGATIVE
COLOR UR AUTO: ABNORMAL
GLUCOSE UR STRIP-MCNC: NEGATIVE MG/DL
HGB UR QL STRIP: ABNORMAL
KETONES UR STRIP-MCNC: 10 MG/DL
LEUKOCYTE ESTERASE UR QL STRIP: ABNORMAL
MUCOUS THREADS #/AREA URNS LPF: PRESENT /LPF
NITRATE UR QL: NEGATIVE
PH UR STRIP: 6.5 [PH] (ref 5–7)
RBC URINE: >182 /HPF
SP GR UR STRIP: 1.01 (ref 1–1.03)
UROBILINOGEN UR STRIP-MCNC: NORMAL MG/DL
WBC CLUMPS #/AREA URNS HPF: PRESENT /HPF
WBC URINE: >182 /HPF

## 2022-10-01 PROCEDURE — 81001 URINALYSIS AUTO W/SCOPE: CPT | Performed by: EMERGENCY MEDICINE

## 2022-10-01 PROCEDURE — 87086 URINE CULTURE/COLONY COUNT: CPT | Performed by: EMERGENCY MEDICINE

## 2022-10-01 PROCEDURE — 99284 EMERGENCY DEPT VISIT MOD MDM: CPT

## 2022-10-01 RX ORDER — LORAZEPAM 2 MG/ML
2 INJECTION INTRAMUSCULAR ONCE
Status: DISCONTINUED | OUTPATIENT
Start: 2022-10-01 | End: 2022-10-01

## 2022-10-01 RX ORDER — CIPROFLOXACIN HYDROCHLORIDE 3.5 MG/ML
1-2 SOLUTION/ DROPS TOPICAL EVERY 4 HOURS
Qty: 5 ML | Refills: 0 | Status: SHIPPED | OUTPATIENT
Start: 2022-10-01 | End: 2022-10-06

## 2022-10-01 RX ORDER — CEPHALEXIN 500 MG/1
500 CAPSULE ORAL 2 TIMES DAILY
Qty: 14 CAPSULE | Refills: 0 | Status: SHIPPED | OUTPATIENT
Start: 2022-10-01 | End: 2022-10-08

## 2022-10-01 ASSESSMENT — ENCOUNTER SYMPTOMS
NAUSEA: 0
EYE REDNESS: 0
FLANK PAIN: 0
HEMATURIA: 1
ABDOMINAL PAIN: 0
DYSURIA: 1
EYE PAIN: 0
VOMITING: 0

## 2022-10-01 ASSESSMENT — ACTIVITIES OF DAILY LIVING (ADL): ADLS_ACUITY_SCORE: 33

## 2022-10-01 NOTE — DISCHARGE INSTRUCTIONS
Discharge Instructions  Urinary Tract Infection  You or your child have been diagnosed with a urinary tract infection, or UTI. The urinary tract includes the kidneys (which make urine/pee), ureters (the tubes that carry urine/pee from the kidneys to the bladder), the bladder (which stores urine/pee), and urethra (the tube that carries urine/pee out of the bladder). Urinary tract infections occur when bacteria travel up the urethra into the bladder (bladder infection) and, in some cases, from there into the kidneys (kidney infection).  Generally, every Emergency Department visit should have a follow-up clinic visit with either a primary or a specialty clinic/provider. Please follow-up as instructed by your emergency provider today.  Return to the Emergency Department if:  You or your child have severe back pain.  You or your child are vomiting (throwing up) so that you cannot take your medicine.  You or your child have a new fever (had not previously had a fever) over 101 F.  You or your child have confusion or are very weak, or feel very ill.  Your child seems much more ill, will not wake up, will not respond right, or is crying for a long time and will not calm down.  You or your child are showing signs of dehydration. These signs may include decreased urination (pee), dry mouth/gums/tongue, or decreased activity.    Follow-up with your provider:   Children under 24 months need to be seen by their regular provider within one week after a diagnosis of a UTI. It may be necessary to do some more tests to look at the child s kidney or bladder.  You should begin to feel better within 24 - 48 hours of starting your antibiotic; follow-up with your regular clinic/doctor/provider if this is not the case.    Treatment:   You will be treated with an antibiotic to kill the bacteria. We have to make an educated guess, based on what we know about common bacteria and antibiotics, as to which antibiotic will work for your  "infection. We will be correct most times but there will be some cases where the antibiotic chosen is not correct (see urine cultures below).  Take a pain medication such as acetaminophen (Tylenol ) or ibuprofen (Advil , Motrin , Nuprin ).  Phenazopyridine (Pyridium , Uristat ) is a prescription medication that numbs the bladder to reduce the burning pain of some UTIs.  The same medication is available in a non-prescription version (Azo-Standard , Urodol ). This medication will change the color of the urine and tears (usually blue or orange). If you wear contacts, do not wear them while taking this medication as they may be stained by the medication.    Urine Cultures:  If indicated, a urine culture may have been performed today. This test generally takes 24-48 hours to complete so the results are not known at this time. The results can confirm that an infection is present but also determine which antibiotic is effective for the specific bacteria that is causing the infection. If your urine culture shows that the antibiotic you were given today will not work to treat your infection, we will attempt to contact you to make arrangements to change the antibiotic. If the culture confirms that the antibiotic is effective for your infection, you will not be contacted. We often recommend follow-up with your regular physician/provider on the culture results regardless of this process.    Antibiotic Warning:   If you have been placed on antibiotics - watch for signs of allergic reaction.  These include rash, lip swelling, difficulty breathing, wheezing, and dizziness.  If you develop any of these symptoms, stop the antibiotic immediately and go to an emergency room or urgent care for evaluation.    Probiotics: If you have been given an antibiotic, you may want to also take a probiotic pill or eat yogurt with live cultures. Probiotics have \"good bacteria\" to help your intestines stay healthy. Studies have shown that probiotics " help prevent diarrhea and other intestine problems (including C. diff infection) when you take antibiotics. You can buy these without a prescription in the pharmacy section of the store.   If you were given a prescription for medicine here today, be sure to read all of the information (including the package insert) that comes with your prescription.  This will include important information about the medicine, its side effects, and any warnings that you need to know about.  The pharmacist who fills the prescription can provide more information and answer questions you may have about the medicine.  If you have questions or concerns that the pharmacist cannot address, please call or return to the Emergency Department.   Remember that you can always come back to the Emergency Department if you are not able to see your regular provider in the amount of time listed above, if you get any new symptoms, or if there is anything that worries you.     You may have a lacrimal duct blockage (less likely infection) or conjunctivitis (less likely). Use warm compresses regularly (do not burn your face), gentle massage,  and eye drops as discussed.

## 2022-10-01 NOTE — ED PROVIDER NOTES
History     Chief Complaint:  Eye Pain and UTI       HPI   Mary Carmen Jenkins is a 57 year old female who presents with concerns for swelling near her left lower eyelid as well as dysuria.  She notes yesterday she developed some discomfort in the medial aspect of her left eye.  She woke this morning and it was swollen and little red.  She notes that she used warm compresses yesterday to help.  She denies pain of the eye itself or any vision changes.  She wears contact lenses daily but does not have them in now.  She is concerned about a blocked tear duct.  She also notes when she awoke she was having pain with urination and some blood in her urine.  She denies abdominal pain or flank pain. She denies fever or chills.  She denies nausea or vomiting.    ROS:  Review of Systems   Eyes: Negative for pain and redness.        Positive for swelling and redness in the left lower lid   Gastrointestinal: Negative for abdominal pain, nausea and vomiting.   Genitourinary: Positive for dysuria and hematuria. Negative for flank pain.   All other systems reviewed and are negative.        Allergies:  Seasonal Allergies     Medications:    No daily medications    Past Medical History:    Past Medical History:   Diagnosis Date     Excessive or frequent menstruation      Iron deficiency anemia, unspecified      PONV (postoperative nausea and vomiting)      Thyroid disease        Past Surgical History:    Past Surgical History:   Procedure Laterality Date     BIOPSY OF SKIN LESION      left scapula (premalignant)     C  DELIVERY ONLY       COLONOSCOPY  2013    Procedure: COLONOSCOPY;  Colonoscopy;  Surgeon: Waldemar Cuellar MD;  Location:  GI     MOHS MICROGRAPHIC PROCEDURE      right leg     THYROID FNA      Benign.  Normal thyroid function tests        Family History:    family history includes Alcohol/Drug in her father; Asthma in an other family member; C.A.D. in her brother and maternal uncle; Cancer in her  brother, father, paternal aunt, and paternal uncle; Cerebrovascular Disease in her maternal grandmother; Cerebrovascular Disease (age of onset: 87) in her mother; Diabetes in her maternal aunt and maternal uncle; Heart Disease in her maternal uncle; Hypertension in her maternal aunt and maternal uncle; Lipids in her brother and brother; Obesity in her brother, brother, maternal aunt, maternal uncle, and paternal aunt.    Social History:   reports that she has never smoked. She has never used smokeless tobacco. She reports current alcohol use. She reports that she does not use drugs.  PCP: [facility]     Physical Exam     Patient Vitals for the past 24 hrs:   BP Temp Temp src Pulse Resp SpO2   10/01/22 0524 134/75 97.6  F (36.4  C) Temporal 96 18 98 %        Physical Exam  General: Adult female sitting upright  Eyes: PERRL, Conjunctive within normal limits on the right, mildly injected on the left. EOMI.  Mild left medial lower eyelid edema and associated erythema.  The left lacrimal duct os appears mildly dilated compared to the right.  No drainage.  ENT: Moist mucous membranes, oropharynx clear.   GI: Abdomen is soft, nontender and nondistended. No palpable masses. No rebound or guarding.  No CVA tenderness to percussion  MSK: Ambulatory  Skin: Warm and dry.  Mild erythema of the left medial canthus/lower lid region as described above.  Neuro: Alert and oriented. Responds appropriately to all questions and commands.   Psych: Normal mood and affect. Pleasant.    Emergency Department Course     Laboratory:  Labs Ordered and Resulted from Time of ED Arrival to Time of ED Departure   ROUTINE UA WITH MICROSCOPIC REFLEX TO CULTURE - Abnormal       Result Value    Color Urine Orange (*)     Appearance Urine Slightly Cloudy (*)     Glucose Urine Negative      Bilirubin Urine Negative      Ketones Urine 10  (*)     Specific Gravity Urine 1.011      Blood Urine Large (*)     pH Urine 6.5      Protein  Albumin Urine 30  (*)     Urobilinogen Urine Normal      Nitrite Urine Negative      Leukocyte Esterase Urine Large (*)     Bacteria Urine Moderate (*)     WBC Clumps Urine Present (*)     Mucus Urine Present (*)     RBC Urine >182 (*)     WBC Urine >182 (*)    URINE CULTURE          Emergency Department Course:    Reviewed:  I reviewed nursing notes, vitals and past medical history    Assessments:   I obtained history and examined the patient as noted above.    I rechecked the patient and explained findings.  She denies any new concerns.    Disposition:  The patient was discharged to home.     Impression & Plan    Medical Decision Making:  Mary Carmen Jenkins is a 57-year-old female who presents emergency department due to concerns, one being onset of dysuria and hematuria this morning and the other being mild redness and swelling of the left medial eye.  Clinically she likely has hemorrhagic cystitis.  She has no findings that would suggest that there is an ascending UTI/pyelonephritis.  She is afebrile and well in appearance.  She has no abdominal or flank pain or tenderness.  With regards to her eye, perhaps mild conjunctivitis versus lacrimal duct obstruction.  Bacterial infection seems less likely at this point with short duration of symptoms.  Recommended warm compresses and gentle massage of the area.  Sent home with ciprofloxacin eyedrops and so contact lens user although she has no eye pain or clinical concerns for corneal abrasion/ulceration.  She has [place] no eye pain aside from at the medial region where the lacrimal duct is.  She is recommended follow-up with ophthalmology on Monday with ongoing symptoms for this.  With regards to UTI.  She also follow-up on Monday with her primary care provider for reassessment should she have ongoing symptoms.  Should return immediate emergency department worsening of any of her symptoms.  All questions were answered prior to discharge.      Diagnosis:    ICD-10-CM     1. Acute UTI  N39.0    2. Obstruction of left lacrimal duct  H04.552         Discharge Medications:  Discharge Medication List as of 10/1/2022  7:11 AM      START taking these medications    Details   cephALEXin (KEFLEX) 500 MG capsule Take 1 capsule (500 mg) by mouth 2 times daily for 7 days, Disp-14 capsule, R-0, E-Prescribe      ciprofloxacin (CILOXAN) 0.3 % ophthalmic solution Place 1-2 drops Into the left eye every 4 hours for 5 days, Disp-5 mL, R-0, E-Prescribe              10/1/2022   Aysha Newell MD Jonkman, Tracy Dianne, MD  10/01/22 1035

## 2022-10-01 NOTE — ED TRIAGE NOTES
Pt arrives to ED with dysuria and clogged L tear duct. Dysuria began at 0400 this am, denies flank pain or fevers. Pt states duct problem has been going on since yesterday. Pt took 2 azo PTA.

## 2022-10-02 LAB
BACTERIA UR CULT: ABNORMAL
BACTERIA UR CULT: ABNORMAL

## 2022-10-17 ENCOUNTER — TELEPHONE (OUTPATIENT)
Dept: OBGYN | Facility: CLINIC | Age: 57
End: 2022-10-17

## 2022-10-17 NOTE — TELEPHONE ENCOUNTER
Reason for Call:  Other appointment    Detailed comments: Pt needs first available appt and would also like a mammogram     Phone Number Patient can be reached at: Cell number on file:    Telephone Information:   Mobile [phone]       Best Time: Anytime    Can we leave a detailed message on this number? YES    Call taken on 10/17/2022 at 4:51 PM by MAU CABA

## 2022-10-21 ENCOUNTER — HOSPITAL ENCOUNTER (OUTPATIENT)
Dept: MAMMOGRAPHY | Facility: CLINIC | Age: 57
Discharge: HOME | End: 2022-10-21
Attending: OBSTETRICS & GYNECOLOGY | Admitting: OBSTETRICS & GYNECOLOGY
Payer: COMMERCIAL

## 2022-10-21 DIAGNOSIS — Z12.31 VISIT FOR SCREENING MAMMOGRAM: ICD-10-CM

## 2022-10-21 PROCEDURE — 77067 SCR MAMMO BI INCL CAD: CPT

## 2023-01-12 ENCOUNTER — OFFICE VISIT (OUTPATIENT)
Dept: OBGYN | Facility: CLINIC | Age: 58
End: 2023-01-12
Payer: COMMERCIAL

## 2023-01-12 VITALS
HEIGHT: 61 IN | DIASTOLIC BLOOD PRESSURE: 82 MMHG | SYSTOLIC BLOOD PRESSURE: 124 MMHG | WEIGHT: 143 LBS | BODY MASS INDEX: 27 KG/M2 | HEART RATE: 80 BPM

## 2023-01-12 DIAGNOSIS — Z12.4 PAP SMEAR FOR CERVICAL CANCER SCREENING: ICD-10-CM

## 2023-01-12 DIAGNOSIS — Z12.11 COLON CANCER SCREENING: ICD-10-CM

## 2023-01-12 DIAGNOSIS — N95.2 ATROPHIC VAGINITIS: ICD-10-CM

## 2023-01-12 DIAGNOSIS — Z00.00 ROUTINE GENERAL MEDICAL EXAMINATION AT A HEALTH CARE FACILITY: ICD-10-CM

## 2023-01-12 DIAGNOSIS — Z13.220 SCREENING FOR LIPID DISORDERS: ICD-10-CM

## 2023-01-12 DIAGNOSIS — Z86.2 HISTORY OF ANEMIA: Primary | ICD-10-CM

## 2023-01-12 LAB
CHOLEST SERPL-MCNC: 167 MG/DL
FASTING STATUS PATIENT QL REPORTED: YES
FERRITIN SERPL-MCNC: 69 NG/ML (ref 11–328)
GLUCOSE SERPL-MCNC: 96 MG/DL (ref 70–99)
HDLC SERPL-MCNC: 71 MG/DL
HGB BLD-MCNC: 14.9 G/DL (ref 11.7–15.7)
LDLC SERPL CALC-MCNC: 84 MG/DL
NONHDLC SERPL-MCNC: 96 MG/DL
TRIGL SERPL-MCNC: 59 MG/DL
TSH SERPL DL<=0.005 MIU/L-ACNC: 1.12 UIU/ML (ref 0.3–4.2)

## 2023-01-12 PROCEDURE — 84443 ASSAY THYROID STIM HORMONE: CPT | Performed by: OBSTETRICS & GYNECOLOGY

## 2023-01-12 PROCEDURE — 90682 RIV4 VACC RECOMBINANT DNA IM: CPT | Performed by: OBSTETRICS & GYNECOLOGY

## 2023-01-12 PROCEDURE — 99386 PREV VISIT NEW AGE 40-64: CPT | Mod: 25 | Performed by: OBSTETRICS & GYNECOLOGY

## 2023-01-12 PROCEDURE — 80061 LIPID PANEL: CPT | Performed by: OBSTETRICS & GYNECOLOGY

## 2023-01-12 PROCEDURE — G0145 SCR C/V CYTO,THINLAYER,RESCR: HCPCS | Performed by: OBSTETRICS & GYNECOLOGY

## 2023-01-12 PROCEDURE — 36415 COLL VENOUS BLD VENIPUNCTURE: CPT | Performed by: OBSTETRICS & GYNECOLOGY

## 2023-01-12 PROCEDURE — 90471 IMMUNIZATION ADMIN: CPT | Performed by: OBSTETRICS & GYNECOLOGY

## 2023-01-12 PROCEDURE — 99213 OFFICE O/P EST LOW 20 MIN: CPT | Mod: 25 | Performed by: OBSTETRICS & GYNECOLOGY

## 2023-01-12 PROCEDURE — 82947 ASSAY GLUCOSE BLOOD QUANT: CPT | Performed by: OBSTETRICS & GYNECOLOGY

## 2023-01-12 PROCEDURE — 85018 HEMOGLOBIN: CPT | Performed by: OBSTETRICS & GYNECOLOGY

## 2023-01-12 PROCEDURE — 87624 HPV HI-RISK TYP POOLED RSLT: CPT | Performed by: OBSTETRICS & GYNECOLOGY

## 2023-01-12 PROCEDURE — 82728 ASSAY OF FERRITIN: CPT | Performed by: OBSTETRICS & GYNECOLOGY

## 2023-01-12 RX ORDER — ESTRADIOL 0.1 MG/G
CREAM VAGINAL
Qty: 42.5 G | Refills: 6 | Status: SHIPPED | OUTPATIENT
Start: 2023-01-12 | End: 2024-04-26 | Stop reason: SINTOL

## 2023-01-12 NOTE — PROGRESS NOTES
Mary Carmen is a 57 year old  who presents for annual exam.   Postmenopausal.  She is having vaginal dryness. No vaginal bleeding noted.     Besides routine health maintenance,  she would like to discuss vaginal dryness and pain with intercourse as a result. No issues with any time other than with intercourse. Bladder leakage at times with exercise (walking for exercise).  GYNECOLOGIC HISTORY:  She is sexually active with 1 male partner(s).  STI testing offered?  not indicated  History of abnormal Pap smear: NO - age 30-65 PAP every 5 years with negative HPV co-testing recommended  Family history of breast CA: No  Family history of uterine/ovarian CA: No     Family history of colon CA: No    HEALTH MAINTENANCE:  Reviewed.    HISTORY:  OB History    Para Term  AB Living   4 3 3 0 1 3   SAB IAB Ectopic Multiple Live Births   1 0 0 0 3      # Outcome Date GA Lbr Pino/2nd Weight Sex Delivery Anes PTL Lv   4 SAB      SAB   DEC   3 Term  40w0d       STEPHEN   2 Term  40w0d       STEPHEN   1 Term  40w0d    CS   STEPHEN     Past Medical History:   Diagnosis Date     Excessive or frequent menstruation      Iron deficiency anemia, unspecified      PONV (postoperative nausea and vomiting)      Thyroid disease     benign multinodular goiter     Past Surgical History:   Procedure Laterality Date     BIOPSY OF SKIN LESION      left scapula (premalignant)     COLONOSCOPY  2013    Procedure: COLONOSCOPY;  Colonoscopy;  Surgeon: Wadlemar Cuellar MD;  Location:  GI     MOHS MICROGRAPHIC PROCEDURE      right leg     THYROID FNA      Benign.  Normal thyroid function tests     ZZC  DELIVERY ONLY       Family History   Problem Relation Age of Onset     Cerebrovascular Disease Mother 87        massive hemorrhagic stroke, .     Alcohol/Drug Father         recovering times 10 years        Cancer Father         lung     C.A.D. Brother         by-pass, 60s     Cancer Brother         Leukemia      "Lipids Brother      Lipids Brother      Obesity Brother      Obesity Brother      Cerebrovascular Disease Maternal Grandmother      Diabetes Maternal Aunt         IDDM     Hypertension Maternal Aunt      Obesity Maternal Aunt      C.A.D. Maternal Uncle         by-pass     Diabetes Maternal Uncle         IDDM     Hypertension Maternal Uncle      Heart Disease Maternal Uncle         MI     Obesity Maternal Uncle      Cancer Paternal Aunt         unsure     Obesity Paternal Aunt      Cancer Paternal Uncle         unsure     Asthma Other         nephew     Social History     Socioeconomic History     Marital status:      Spouse name: Dameon Jenkins     Number of children: 3     Years of education: None     Highest education level: None   Occupational History     Occupation: Dietician     Employer: [employer]   Tobacco Use     Smoking status: Never     Smokeless tobacco: Never   Substance and Sexual Activity     Alcohol use: Yes     Alcohol/week: 0.0 standard drinks     Comment: every 2-3 months      Drug use: No     Sexual activity: Yes     Partners: Male     Birth control/protection: Surgical, Post-menopausal     Comment: Vasectomy   Other Topics Concern     Blood Transfusions No     Weight Concern No     Exercise Yes     Seat Belt Yes     Self-Exams Yes   Social History Narrative    Living arrangements - the patient lives with her family.        Current Outpatient Medications:      MOTRIN IB OR, prn headache, Disp: , Rfl:      MULTI-VITAMIN OR TABS, 1 TABLET DAILY, Disp: , Rfl:   Allergies   Allergen Reactions     Seasonal Allergies      Eyes itchy, runny nose       Past medical, surgical, social and family history were reviewed and updated in EPIC.    ROS:  12 point review of systems negative other than symptoms noted below.      EXAM:  /82   Pulse 80   Ht 1.549 m (5' 1\")   Wt 64.9 kg (143 lb)   LMP 12/19/2015 (Exact Date)   BMI 27.02 kg/m     BMI: Body mass index is 27.02 kg/m .  Constitutional: healthy, " alert and no distress  Respiratory: Negative.   Breast: No nodularity, asymmetry or nipple discharge bilaterally.  Gastrointestinal: Abdomen soft, non-tender, non-distended. No masses, organomegaly  Vulva:  No external lesions, normal female hair distribution, no inguinal adenopathy.    Urethra:  Midline, non-tender, well supported, no discharge  Vagina:  Atrophic, no abnormal discharge, no lesions  Cervix: no lesions, no discharge  Uterus:  anteverted, smooth contour, without enlargement, mobile, and without tenderness  Ovaries:  No masses appreciated, non-tender, mobile  Rectal Exam: deferred  Musculoskeletal: extremities normal  Skin: no suspicious lesions or rashes  Psychiatric: Affect appropriate, cooperative, mentation appears normal.     COUNSELING:   Reviewed preventive health counseling, as reflected in patient instructions       Regular exercise       Healthy diet/nutrition       (Maureen)menopause management   reports that she has never smoked. She has never used smokeless tobacco.        FRAX Risk Assessment  ASSESSMENT:  57 year old  with satisfactory annual exam  (Z86.2) History of anemia  (primary encounter diagnosis)  Comment:   Plan: Hemoglobin, Ferritin        History of anemia, should be better now that she is no longer menstruating. If abnormal, follow up with primary care provider.    (Z00.00) Routine general medical examination at a health care facility  Comment:   Plan: INFLUENZA QUAD, RECOMBINANT, P-FREE (RIV4)         (FLUBLOK), Lipid panel reflex to direct LDL         Fasting, GLUCOSE, TSH with free T4 reflex,         Hemoglobin, Ferritin            (Z13.220) Screening for lipid disorders  Comment:   Plan: Lipid panel reflex to direct LDL Fasting            (Z12.4) Pap smear for cervical cancer screening  Comment:   Plan: Pap imaged thin layer screen with HPV -         recommended age 30 - 65        If Pap and HPV are both negative, repeat both in 5 years per ASCCP guidelines.      (N95.2) Atrophic vaginitis  Comment:   Plan: estradiol (ESTRACE VAGINAL) 0.1 MG/GM vaginal         cream        Discussed pathophysiology of genitourinary syndrome of menopause and hormone changes that affect vaginal and urinary tissues. Medication options were discussed (topical estrogen cream vs vaginal tablets/inserts vs estrogen ring). At this time she will try estrace.         Gem Luna MD

## 2023-01-12 NOTE — NURSING NOTE
"Chief Complaint   Patient presents with     Physical       Initial /82   Pulse 80   Ht 1.549 m (5' 1\")   Wt 64.9 kg (143 lb)   LMP 2015 (Exact Date)   BMI 27.02 kg/m   Estimated body mass index is 27.02 kg/m  as calculated from the following:    Height as of this encounter: 1.549 m (5' 1\").    Weight as of this encounter: 64.9 kg (143 lb).  BP completed using cuff size: regular    Questioned patient about current smoking habits.  Pt. has never smoked.          The following HM Due: pap smear      The following patient reported/Care Every where data was sent to:  P ABSTRACT QUALITY INITIATIVES [[id number]]  Cecy Drew LPN               "

## 2023-01-12 NOTE — PATIENT INSTRUCTIONS
Vaginal Atrophy and Sex after Menopause    Vaginal atrophy is a normal part of the aging process and is a reaction to decreased circulating estrogen, but this doesn't mean that there is nothing we can do about it. Symptoms often include vaginal dryness, itching, pain with intercourse, and narrowing of the vagina.     For dryness you can start by trying an over the counter vaginal moisturizer. For vaginal or vulvar moisturizers, I recommend something with hyaluronic acid. Good options are available online (suppository for inside the vagina: Revaree, https://hellobonafide.com/) or Hyalo-gyn (gel for outside the vagina, https://hyalogyn.com/). These can be used together.     Try a silicone or oil based lubricant for intercourse to make it more comfortable.    Lubricants can be used to relieve vaginal dryness during intercourse.  There are so many options available! You may choose a lubricant that is water or silicone based, or you may choose to use an oil instead. If you use a water based lubricant, use one that does not change the pH of the vagina (a good option is Good Clean Love). You want to use something that does not contain glycerin, which can increase the risk for yeast infections. Lubricants are almost always a good idea for intercourse in women over 40, as they will lead to more enjoyable and less painful intercourse. Some good options are: Good Clean Love brands (water based), UberLube or Silk silicone based lubricants, or inert oils like coconut, olive, etc. You should not use oil based lubricants with condoms, or silicone based lubricants with silicone sex toys.    You can purchase lubricants at [employer], or Bandtastic.me if they are hard to find elsewhere. This is a common problem that is receiving more attention, thankfully, and so most drugstores will carry these options now.    Read about sexual issues after menopause at www.[employer].com.     Vaginal estrogen is a medication inserted into the  vagina which can decrease dryness, itching, narrowing, and pain with intercourse. It is administered in very low doses and is locally active. The risks associated with vaginal estrogen are far fewer and less significant than those associated with systemic hormone replacement therapy.    You may need to contact your insurance company to determine which vaginal estrogen formulation is covered at the best rate. This is different for each insurance company and for each type of supplemental coverage. Options include the following:    - Vagifem tablet or Imvexxy insert, inserted vaginally daily for 14 days then 2-3x weekly  - Estring, a ring placed inside the vagina that rests at the top of the vagina for 3 months  - Estrace cream, a cream that is applied daily for 14 days then 2x/week inside the vagina. Apply 0.5g.  - Premarin cream, a cream that is applied daily for 14 days then 2x/week inside the vagina. Apply 0.5g      [facility] can be a great resource for finding ways to cope with changing sexual desire and satisfaction.   - Address: [address] [place] [id number]     - Phone: [phone]       Preventive Health Recommendations  Female Ages 50 - 64    Yearly exam: See your health care provider every year in order to  Review health changes.   Discuss preventive care.    Review your medicines if your doctor has prescribed any.    Get a Pap test every three years (unless you have an abnormal result and your provider advises testing more often).  If you get Pap tests with HPV test, you only need to test every 5 years, unless you have an abnormal result.   You do not need a Pap test if your uterus was removed (hysterectomy) and you have not had cancer.  You should be tested each year for STDs (sexually transmitted diseases) if you're at risk.   Have a mammogram every 1 to 2 years.  Have a colonoscopy at age 50, or have a yearly FIT test (stool test). These exams  screen for colon cancer.    Have a cholesterol test every 5 years, or more often if advised.  Have a diabetes test (fasting glucose) every three years. If you are at risk for diabetes, you should have this test more often.   If you are at risk for osteoporosis (brittle bone disease), think about having a bone density scan (DEXA).    Shots: Get a flu shot each year. Get a tetanus shot every 10 years.    Nutrition:   Eat at least 5 servings of fruits and vegetables each day.  Eat whole-grain bread, whole-wheat pasta and brown rice instead of white grains and rice.  Get adequate Calcium and Vitamin D.     Lifestyle  Exercise at least 150 minutes a week (30 minutes a day, 5 days a week). This will help you control your weight and prevent disease.  Limit alcohol to one drink per day.  No smoking.   Wear sunscreen to prevent skin cancer.   See your dentist every six months for an exam and cleaning.  See your eye doctor every 1 to 2 years.

## 2023-01-16 LAB
BKR LAB AP GYN ADEQUACY: NORMAL
BKR LAB AP GYN INTERPRETATION: NORMAL
BKR LAB AP HPV REFLEX: NORMAL
BKR LAB AP PREVIOUS ABNORMAL: NORMAL
PATH REPORT.COMMENTS IMP SPEC: NORMAL
PATH REPORT.COMMENTS IMP SPEC: NORMAL
PATH REPORT.RELEVANT HX SPEC: NORMAL

## 2023-01-18 LAB
HUMAN PAPILLOMA VIRUS 16 DNA: NEGATIVE
HUMAN PAPILLOMA VIRUS 18 DNA: NEGATIVE
HUMAN PAPILLOMA VIRUS FINAL DIAGNOSIS: NORMAL
HUMAN PAPILLOMA VIRUS OTHER HR: NEGATIVE

## 2023-02-07 ENCOUNTER — TRANSFERRED RECORDS (OUTPATIENT)
Dept: HEALTH INFORMATION MANAGEMENT | Facility: CLINIC | Age: 58
End: 2023-02-07

## 2023-02-07 LAB — COLOGUARD-ABSTRACT: NEGATIVE

## 2023-02-09 ENCOUNTER — LAB (OUTPATIENT)
Dept: OBGYN | Facility: CLINIC | Age: 58
End: 2023-02-09
Payer: COMMERCIAL

## 2023-02-09 DIAGNOSIS — Z12.11 COLON CANCER SCREENING: ICD-10-CM

## 2023-03-02 ENCOUNTER — TELEPHONE (OUTPATIENT)
Dept: OBGYN | Facility: CLINIC | Age: 58
End: 2023-03-02
Payer: COMMERCIAL

## 2023-03-02 NOTE — TELEPHONE ENCOUNTER
Chief Complaint   Patient presents with     Cologuard Results     Fax from [facility] received - states that they received 2 orders for her Cologuard.  I called [facility] and cancelled the second order.  I also notice that the result is in Media in Epic but unable to open.  [facility] faxed the report to me at [phone].    I Left message on voice mail for Mary Carmen to call back so I can give her this information and advise of negative result.    Results placed on Dr. Luna's desk in [place].          Reshma Blankenship CMA

## 2023-03-02 NOTE — TELEPHONE ENCOUNTER
Mary Carmen called back I explained the cancelled order.  She will await for Dr. Luna's result note in My Chart  Reshma Blankenship CMA

## 2023-07-12 ENCOUNTER — LAB (OUTPATIENT)
Dept: OBGYN | Facility: CLINIC | Age: 58
End: 2023-07-12
Payer: COMMERCIAL

## 2023-07-12 DIAGNOSIS — Z12.11 COLON CANCER SCREENING: ICD-10-CM

## 2024-01-04 ENCOUNTER — PATIENT OUTREACH (OUTPATIENT)
Dept: CARE COORDINATION | Facility: CLINIC | Age: 59
End: 2024-01-04
Payer: COMMERCIAL

## 2024-01-18 ENCOUNTER — PATIENT OUTREACH (OUTPATIENT)
Dept: CARE COORDINATION | Facility: CLINIC | Age: 59
End: 2024-01-18
Payer: COMMERCIAL

## 2024-04-23 SDOH — HEALTH STABILITY: PHYSICAL HEALTH: ON AVERAGE, HOW MANY MINUTES DO YOU ENGAGE IN EXERCISE AT THIS LEVEL?: 40 MIN

## 2024-04-23 SDOH — HEALTH STABILITY: PHYSICAL HEALTH: ON AVERAGE, HOW MANY DAYS PER WEEK DO YOU ENGAGE IN MODERATE TO STRENUOUS EXERCISE (LIKE A BRISK WALK)?: 1 DAY

## 2024-04-23 ASSESSMENT — SOCIAL DETERMINANTS OF HEALTH (SDOH): HOW OFTEN DO YOU GET TOGETHER WITH FRIENDS OR RELATIVES?: MORE THAN THREE TIMES A WEEK

## 2024-04-26 ENCOUNTER — OFFICE VISIT (OUTPATIENT)
Dept: INTERNAL MEDICINE | Facility: CLINIC | Age: 59
End: 2024-04-26
Payer: COMMERCIAL

## 2024-04-26 VITALS
BODY MASS INDEX: 27.03 KG/M2 | TEMPERATURE: 97.5 F | OXYGEN SATURATION: 99 % | SYSTOLIC BLOOD PRESSURE: 124 MMHG | WEIGHT: 143.2 LBS | HEIGHT: 61 IN | DIASTOLIC BLOOD PRESSURE: 71 MMHG | HEART RATE: 84 BPM

## 2024-04-26 DIAGNOSIS — Z11.59 NEED FOR HEPATITIS C SCREENING TEST: ICD-10-CM

## 2024-04-26 DIAGNOSIS — Z11.4 SCREENING FOR HIV (HUMAN IMMUNODEFICIENCY VIRUS): ICD-10-CM

## 2024-04-26 DIAGNOSIS — E66.3 OVERWEIGHT (BMI 25.0-29.9): ICD-10-CM

## 2024-04-26 DIAGNOSIS — E04.2 MULTINODULAR GOITER: ICD-10-CM

## 2024-04-26 DIAGNOSIS — Z00.00 ROUTINE GENERAL MEDICAL EXAMINATION AT A HEALTH CARE FACILITY: Primary | ICD-10-CM

## 2024-04-26 PROBLEM — Z85.828 HISTORY OF NONMELANOMA SKIN CANCER: Status: ACTIVE | Noted: 2024-03-12

## 2024-04-26 LAB
ALBUMIN SERPL BCG-MCNC: 4.4 G/DL (ref 3.5–5.2)
ALP SERPL-CCNC: 99 U/L (ref 40–150)
ALT SERPL W P-5'-P-CCNC: 19 U/L (ref 0–50)
ANION GAP SERPL CALCULATED.3IONS-SCNC: 10 MMOL/L (ref 7–15)
AST SERPL W P-5'-P-CCNC: 20 U/L (ref 0–45)
BILIRUB SERPL-MCNC: 0.7 MG/DL
BUN SERPL-MCNC: 17.9 MG/DL (ref 8–23)
CALCIUM SERPL-MCNC: 9.5 MG/DL (ref 8.8–10.2)
CHLORIDE SERPL-SCNC: 105 MMOL/L (ref 98–107)
CHOLEST SERPL-MCNC: 151 MG/DL
CREAT SERPL-MCNC: 0.83 MG/DL (ref 0.51–0.95)
DEPRECATED HCO3 PLAS-SCNC: 26 MMOL/L (ref 22–29)
EGFRCR SERPLBLD CKD-EPI 2021: 80 ML/MIN/1.73M2
ERYTHROCYTE [DISTWIDTH] IN BLOOD BY AUTOMATED COUNT: 12.5 % (ref 10–15)
FASTING STATUS PATIENT QL REPORTED: YES
GLUCOSE SERPL-MCNC: 95 MG/DL (ref 70–99)
HBA1C MFR BLD: 5.3 % (ref 0–5.6)
HCT VFR BLD AUTO: 44.4 % (ref 35–47)
HCV AB SERPL QL IA: NONREACTIVE
HDLC SERPL-MCNC: 71 MG/DL
HGB BLD-MCNC: 14.6 G/DL (ref 11.7–15.7)
HIV 1+2 AB+HIV1 P24 AG SERPL QL IA: NONREACTIVE
LDLC SERPL CALC-MCNC: 69 MG/DL
MCH RBC QN AUTO: 28.1 PG (ref 26.5–33)
MCHC RBC AUTO-ENTMCNC: 32.9 G/DL (ref 31.5–36.5)
MCV RBC AUTO: 86 FL (ref 78–100)
NONHDLC SERPL-MCNC: 80 MG/DL
PLATELET # BLD AUTO: 254 10E3/UL (ref 150–450)
POTASSIUM SERPL-SCNC: 4 MMOL/L (ref 3.4–5.3)
PROT SERPL-MCNC: 7.3 G/DL (ref 6.4–8.3)
RBC # BLD AUTO: 5.19 10E6/UL (ref 3.8–5.2)
SODIUM SERPL-SCNC: 141 MMOL/L (ref 135–145)
TRIGL SERPL-MCNC: 54 MG/DL
TSH SERPL DL<=0.005 MIU/L-ACNC: 0.85 UIU/ML (ref 0.3–4.2)
VIT D+METAB SERPL-MCNC: 30 NG/ML (ref 20–50)
WBC # BLD AUTO: 4.5 10E3/UL (ref 4–11)

## 2024-04-26 PROCEDURE — 87389 HIV-1 AG W/HIV-1&-2 AB AG IA: CPT | Performed by: INTERNAL MEDICINE

## 2024-04-26 PROCEDURE — 90471 IMMUNIZATION ADMIN: CPT | Performed by: INTERNAL MEDICINE

## 2024-04-26 PROCEDURE — 85027 COMPLETE CBC AUTOMATED: CPT | Performed by: INTERNAL MEDICINE

## 2024-04-26 PROCEDURE — 86803 HEPATITIS C AB TEST: CPT | Performed by: INTERNAL MEDICINE

## 2024-04-26 PROCEDURE — 90714 TD VACC NO PRESV 7 YRS+ IM: CPT | Performed by: INTERNAL MEDICINE

## 2024-04-26 PROCEDURE — 80061 LIPID PANEL: CPT | Performed by: INTERNAL MEDICINE

## 2024-04-26 PROCEDURE — 84443 ASSAY THYROID STIM HORMONE: CPT | Performed by: INTERNAL MEDICINE

## 2024-04-26 PROCEDURE — 83036 HEMOGLOBIN GLYCOSYLATED A1C: CPT | Performed by: INTERNAL MEDICINE

## 2024-04-26 PROCEDURE — 80053 COMPREHEN METABOLIC PANEL: CPT | Performed by: INTERNAL MEDICINE

## 2024-04-26 PROCEDURE — 82306 VITAMIN D 25 HYDROXY: CPT | Performed by: INTERNAL MEDICINE

## 2024-04-26 PROCEDURE — 36415 COLL VENOUS BLD VENIPUNCTURE: CPT | Performed by: INTERNAL MEDICINE

## 2024-04-26 PROCEDURE — 99386 PREV VISIT NEW AGE 40-64: CPT | Mod: 25 | Performed by: INTERNAL MEDICINE

## 2024-04-26 NOTE — PATIENT INSTRUCTIONS
Preventive Care Advice   This is general advice given by our system to help you stay healthy. However, your care team may have specific advice just for you. Please talk to your care team about your preventive care needs.  Nutrition  Eat 5 or more servings of fruits and vegetables each day.  Try wheat bread, brown rice and whole grain pasta (instead of white bread, rice, and pasta).  Get enough calcium and vitamin D. Check the label on foods and aim for 100% of the RDA (recommended daily allowance).  Lifestyle  Exercise at least 150 minutes each week   (30 minutes a day, 5 days a week).  Do muscle strengthening activities 2 days a week. These help control your weight and prevent disease.  No smoking.  Wear sunscreen to prevent skin cancer.  Have a dental exam and cleaning every 6 months.  Yearly exams  See your health care team every year to talk about:  Any changes in your health.  Any medicines your care team has prescribed.  Preventive care, family planning, and ways to prevent chronic diseases.  Shots (vaccines)   HPV shots (up to age 26), if you've never had them before.  Hepatitis B shots (up to age 59), if you've never had them before.  COVID-19 shot: Get this shot when it's due.  Flu shot: Get a flu shot every year.  Tetanus shot: Get a tetanus shot every 10 years.  Pneumococcal, hepatitis A, and RSV shots: Ask your care team if you need these based on your risk.  Shingles shot (for age 50 and up).  General health tests  Diabetes screening:  Starting at age 35, Get screened for diabetes at least every 3 years.  If you are younger than age 35, ask your care team if you should be screened for diabetes.  Cholesterol test: At age 39, start having a cholesterol test every 5 years, or more often if advised.  Bone density scan (DEXA): At age 50, ask your care team if you should have this scan for osteoporosis (brittle bones).  Hepatitis C: Get tested at least once in your life.  STIs (sexually transmitted  infections)  Before age 24: Ask your care team if you should be screened for STIs.  After age 24: Get screened for STIs if you're at risk. You are at risk for STIs (including HIV) if:  You are sexually active with more than one person.  You don't use condoms every time.  You or a partner was diagnosed with a sexually transmitted infection.  If you are at risk for HIV, ask about PrEP medicine to prevent HIV.  Get tested for HIV at least once in your life, whether you are at risk for HIV or not.  Cancer screening tests  Cervical cancer screening: If you have a cervix, begin getting regular cervical cancer screening tests at age 21. Most people who have regular screenings with normal results can stop after age 65. Talk about this with your provider.  Breast cancer scan (mammogram): If you've ever had breasts, begin having regular mammograms starting at age 40. This is a scan to check for breast cancer.  Colon cancer screening: It is important to start screening for colon cancer at age 45.  Have a colonoscopy test every 10 years (or more often if you're at risk) Or, ask your provider about stool tests like a FIT test every year or Cologuard test every 3 years.  To learn more about your testing options, visit: https://www.[email or web address]/281378.pdf.  For help making a decision, visit: https://bit.ly/ld30846.  Prostate cancer screening test: If you have a prostate and are age 55 to 69, ask your provider if you would benefit from a yearly prostate cancer screening test.  Lung cancer screening: If you are a current or former smoker age 50 to 80, ask your care team if ongoing lung cancer screenings are right for you.  For informational purposes only. Not to replace the advice of your health care provider. Copyright   2023 [employer]. All rights reserved. Clinically reviewed by the [facility]. [employer] 239156 - REV 01/24.

## 2024-04-26 NOTE — PROGRESS NOTES
"Preventive Care Visit  [facility]  Reed Haque MD, Internal Medicine  Apr 26, 2024      Assessment & Plan       Routine general medical examination at a health care facility  Screening labs ordered  Screening for HIV (human immunodeficiency virus)  Ordered  Need for hepatitis C screening test  Ordered    Multinodular goiter  Asymptomatic, check TSH    Overweight (BMI 25.0-29.9)  Diet and exercise      Patient has been advised of split billing requirements and indicates understanding: Yes          BMI  Estimated body mass index is 27.06 kg/m  as calculated from the following:    Height as of this encounter: 1.549 m (5' 1\").    Weight as of this encounter: 65 kg (143 lb 3.2 oz).   Weight management plan: Discussed healthy diet and exercise guidelines    Counseling  Appropriate preventive services were discussed with this patient, including applicable screening as appropriate for fall prevention, nutrition, physical activity, Tobacco-use cessation, weight loss and cognition.  Checklist reviewing preventive services available has been given to the patient.  Reviewed patient's diet, addressing concerns and/or questions.   She is at risk for lack of exercise and has been provided with information to increase physical activity for the benefit of her well-being.       Follow-up for annual physicals.    Chasity Lentz is a 59 year old, presenting for the following:  Physical         Health Care Directive  Patient does not have a Health Care Directive or Living Will: Discussed advance care planning with patient; however, patient declined at this time.    HPI      Here for physical.    Denies any specific complaints today.    Has a history of multinodular thyroid gland, had nodules biopsied in the past over 10 years ago and they were normal.  Denies any dysphagia.  TSH has been normal.  No family history of thyroid cancer.    Had shingles 1 and half year ago and is wondering about the " shot.              4/23/2024   General Health   How would you rate your overall physical health? Good   Feel stress (tense, anxious, or unable to sleep) Not at all         4/23/2024   Nutrition   Three or more servings of calcium each day? Yes   Diet: Regular (no restrictions)   How many servings of fruit and vegetables per day? (!) 2-3   How many sweetened beverages each day? 0-1         4/23/2024   Exercise   Days per week of moderate/strenous exercise 1 day   Average minutes spent exercising at this level 40 min   (!) EXERCISE CONCERN      4/23/2024   Social Factors   Frequency of gathering with friends or relatives More than three times a week   Worry food won't last until get money to buy more No   Food not last or not have enough money for food? No   Do you have housing?  Yes   Are you worried about losing your housing? No   Lack of transportation? No   Unable to get utilities (heat,electricity)? No         4/23/2024   Fall Risk   Fallen 2 or more times in the past year? No   Trouble with walking or balance? No          4/23/2024   Dental   Dentist two times every year? Yes         4/23/2024   TB Screening   Were you born outside of the [place]? No           Today's PHQ-2 Score:       3/13/2024    11:52 AM   PHQ-2 ( 1999 [employer])   Q1: Little interest or pleasure in doing things 0   Q2: Feeling down, depressed or hopeless 0   PHQ-2 Score 0   Q1: Little interest or pleasure in doing things Not at all   Q2: Feeling down, depressed or hopeless Not at all   PHQ-2 Score 0         4/23/2024   Substance Use   Alcohol more than 3/day or more than 7/wk No   Do you use any other substances recreationally? No     Social History     Tobacco Use    Smoking status: Never    Smokeless tobacco: Never   Substance Use Topics    Alcohol use: Yes     Comment: very occasionally    Drug use: No             4/23/2024   Breast Cancer Screening   Family history of breast, colon, or ovarian cancer? No / Unknown         10/21/2022   LAST  FHS-7 RESULTS   1st degree relative breast or ovarian cancer No   Any relative bilateral breast cancer No   Any male have breast cancer No   Any ONE woman have BOTH breast AND ovarian cancer No   Any woman with breast cancer before 50yrs No   2 or more relatives with breast AND/OR ovarian cancer No   2 or more relatives with breast AND/OR bowel cancer No        Mammogram Screening - Mammogram every 1-2 years updated in Health Maintenance based on mutual decision making          4/23/2024   One time HIV Screening   Previous HIV test? No         4/23/2024   STI Screening   New sexual partner(s) since last STI/HIV test? No     History of abnormal Pap smear: NO - age 30-65 PAP every 5 years with negative HPV co-testing recommended        Latest Ref Rng & Units 1/12/2023     8:56 AM 2/2/2016     3:15 PM 2/2/2016    12:00 AM   PAP / HPV   PAP  Negative for Intraepithelial Lesion or Malignancy (NILM)      PAP (Historical)    NIL    HPV 16 DNA Negative Negative  Negative     HPV 18 DNA Negative Negative  Negative     Other HR HPV Negative Negative  Negative       ASCVD Risk   The 10-year ASCVD risk score (Eloina CHANEY, et al., 2019) is: 2.6%    Values used to calculate the score:      Age: 59 years      Sex: Female      Is Non- : No      Diabetic: No      Tobacco smoker: No      Systolic Blood Pressure: 124 mmHg      Is BP treated: No      HDL Cholesterol: 71 mg/dL      Total Cholesterol: 167 mg/dL           Reviewed and updated as needed this visit by Provider   Tobacco  Allergies  Meds  Problems  Med Hx  Surg Hx  Fam Hx            Past Medical History:   Diagnosis Date    Cancer (H)     basal cell cancer    Excessive or frequent menstruation     Iron deficiency anemia, unspecified     PONV (postoperative nausea and vomiting)     Thyroid disease     benign multinodular goiter         Review of Systems  Constitutional, HEENT, cardiovascular, pulmonary, GI, , musculoskeletal, neuro,  "skin, endocrine and psych systems are negative, except as otherwise noted.     Objective    Exam  /71   Pulse 84   Temp 97.5  F (36.4  C) (Temporal)   Ht 1.549 m (5' 1\")   Wt 65 kg (143 lb 3.2 oz)   LMP 12/19/2015 ([facility] Date)   SpO2 99%   BMI 27.06 kg/m     Estimated body mass index is 27.06 kg/m  as calculated from the following:    Height as of this encounter: 1.549 m (5' 1\").    Weight as of this encounter: 65 kg (143 lb 3.2 oz).    Physical Exam  GENERAL: alert and no distress  EYES: Eyes grossly normal to inspection, PERRL and conjunctivae and sclerae normal  HENT: ear canals and TM's normal, nose and mouth without ulcers or lesions  NECK: no adenopathy, no asymmetry, masses, or scars  RESP: lungs clear to auscultation - no rales, rhonchi or wheezes  CV: regular rate and rhythm, normal S1 S2, no S3 or S4, no murmur, click or rub, no peripheral edema  ABDOMEN: soft, nontender, no hepatosplenomegaly, no masses and bowel sounds normal  MS: no gross musculoskeletal defects noted, no edema  SKIN: no suspicious lesions or rashes  NEURO: Normal strength and tone, mentation intact and speech normal  PSYCH: mentation appears normal, affect normal/bright        Signed Electronically by: Reed Haque MD    "

## 2024-05-01 ENCOUNTER — HOSPITAL ENCOUNTER (OUTPATIENT)
Dept: MAMMOGRAPHY | Facility: CLINIC | Age: 59
Discharge: HOME | End: 2024-05-01
Attending: OBSTETRICS & GYNECOLOGY | Admitting: OBSTETRICS & GYNECOLOGY
Payer: COMMERCIAL

## 2024-05-01 DIAGNOSIS — Z12.31 VISIT FOR SCREENING MAMMOGRAM: ICD-10-CM

## 2024-05-01 PROCEDURE — 77063 BREAST TOMOSYNTHESIS BI: CPT

## 2025-03-27 ENCOUNTER — PATIENT OUTREACH (OUTPATIENT)
Dept: CARE COORDINATION | Facility: CLINIC | Age: 60
End: 2025-03-27
Payer: COMMERCIAL

## 2025-04-10 ENCOUNTER — PATIENT OUTREACH (OUTPATIENT)
Dept: CARE COORDINATION | Facility: CLINIC | Age: 60
End: 2025-04-10
Payer: COMMERCIAL